# Patient Record
Sex: FEMALE | Race: WHITE | NOT HISPANIC OR LATINO | URBAN - METROPOLITAN AREA
[De-identification: names, ages, dates, MRNs, and addresses within clinical notes are randomized per-mention and may not be internally consistent; named-entity substitution may affect disease eponyms.]

---

## 2018-11-26 ENCOUNTER — INPATIENT (INPATIENT)
Facility: HOSPITAL | Age: 83
LOS: 1 days | Discharge: HOME | DRG: 310 | End: 2018-11-28
Attending: INTERNAL MEDICINE | Admitting: INTERNAL MEDICINE
Payer: MEDICARE

## 2018-11-26 VITALS
TEMPERATURE: 98 F | WEIGHT: 175.05 LBS | SYSTOLIC BLOOD PRESSURE: 176 MMHG | DIASTOLIC BLOOD PRESSURE: 118 MMHG | OXYGEN SATURATION: 98 % | HEART RATE: 106 BPM | RESPIRATION RATE: 16 BRPM

## 2018-11-26 DIAGNOSIS — R07.9 CHEST PAIN, UNSPECIFIED: ICD-10-CM

## 2018-11-26 DIAGNOSIS — I48.91 UNSPECIFIED ATRIAL FIBRILLATION: ICD-10-CM

## 2018-11-26 DIAGNOSIS — E11.9 TYPE 2 DIABETES MELLITUS WITHOUT COMPLICATIONS: ICD-10-CM

## 2018-11-26 DIAGNOSIS — I10 ESSENTIAL (PRIMARY) HYPERTENSION: ICD-10-CM

## 2018-11-26 DIAGNOSIS — E78.00 PURE HYPERCHOLESTEROLEMIA, UNSPECIFIED: ICD-10-CM

## 2018-11-26 DIAGNOSIS — Z29.9 ENCOUNTER FOR PROPHYLACTIC MEASURES, UNSPECIFIED: ICD-10-CM

## 2018-11-26 LAB
ACETONE SERPL-MCNC: NEGATIVE — SIGNIFICANT CHANGE UP
ALBUMIN SERPL ELPH-MCNC: 3.3 G/DL — LOW (ref 3.5–5)
ALP SERPL-CCNC: 59 U/L — SIGNIFICANT CHANGE UP (ref 40–120)
ALT FLD-CCNC: 18 U/L DA — SIGNIFICANT CHANGE UP (ref 10–60)
ANION GAP SERPL CALC-SCNC: 10 MMOL/L — SIGNIFICANT CHANGE UP (ref 5–17)
APPEARANCE UR: CLEAR — SIGNIFICANT CHANGE UP
APTT BLD: 30.9 SEC — SIGNIFICANT CHANGE UP (ref 27.5–36.3)
AST SERPL-CCNC: 22 U/L — SIGNIFICANT CHANGE UP (ref 10–40)
BILIRUB SERPL-MCNC: 0.5 MG/DL — SIGNIFICANT CHANGE UP (ref 0.2–1.2)
BILIRUB UR-MCNC: NEGATIVE — SIGNIFICANT CHANGE UP
BUN SERPL-MCNC: 22 MG/DL — HIGH (ref 7–18)
CALCIUM SERPL-MCNC: 8.6 MG/DL — SIGNIFICANT CHANGE UP (ref 8.4–10.5)
CHLORIDE SERPL-SCNC: 104 MMOL/L — SIGNIFICANT CHANGE UP (ref 96–108)
CK SERPL-CCNC: 48 U/L — SIGNIFICANT CHANGE UP (ref 21–215)
CO2 SERPL-SCNC: 24 MMOL/L — SIGNIFICANT CHANGE UP (ref 22–31)
COLOR SPEC: YELLOW — SIGNIFICANT CHANGE UP
CREAT SERPL-MCNC: 0.88 MG/DL — SIGNIFICANT CHANGE UP (ref 0.5–1.3)
DIFF PNL FLD: NEGATIVE — SIGNIFICANT CHANGE UP
GLUCOSE SERPL-MCNC: 145 MG/DL — HIGH (ref 70–99)
GLUCOSE UR QL: NEGATIVE — SIGNIFICANT CHANGE UP
HCT VFR BLD CALC: 34.5 % — SIGNIFICANT CHANGE UP (ref 34.5–45)
HGB BLD-MCNC: 10.2 G/DL — LOW (ref 11.5–15.5)
INR BLD: 1.3 RATIO — HIGH (ref 0.88–1.16)
KETONES UR-MCNC: NEGATIVE — SIGNIFICANT CHANGE UP
LEUKOCYTE ESTERASE UR-ACNC: NEGATIVE — SIGNIFICANT CHANGE UP
LIDOCAIN IGE QN: 162 U/L — SIGNIFICANT CHANGE UP (ref 73–393)
MAGNESIUM SERPL-MCNC: 1.9 MG/DL — SIGNIFICANT CHANGE UP (ref 1.6–2.6)
MCHC RBC-ENTMCNC: 21.2 PG — LOW (ref 27–34)
MCHC RBC-ENTMCNC: 29.6 GM/DL — LOW (ref 32–36)
MCV RBC AUTO: 71.7 FL — LOW (ref 80–100)
NITRITE UR-MCNC: NEGATIVE — SIGNIFICANT CHANGE UP
NT-PROBNP SERPL-SCNC: 5231 PG/ML — HIGH (ref 0–450)
PH UR: 6.5 — SIGNIFICANT CHANGE UP (ref 5–8)
PLATELET # BLD AUTO: 253 K/UL — SIGNIFICANT CHANGE UP (ref 150–400)
POTASSIUM SERPL-MCNC: 3.7 MMOL/L — SIGNIFICANT CHANGE UP (ref 3.5–5.3)
POTASSIUM SERPL-SCNC: 3.7 MMOL/L — SIGNIFICANT CHANGE UP (ref 3.5–5.3)
PROT SERPL-MCNC: 7.8 G/DL — SIGNIFICANT CHANGE UP (ref 6–8.3)
PROT UR-MCNC: NEGATIVE — SIGNIFICANT CHANGE UP
PROTHROM AB SERPL-ACNC: 14.6 SEC — HIGH (ref 10–12.9)
RBC # BLD: 4.82 M/UL — SIGNIFICANT CHANGE UP (ref 3.8–5.2)
RBC # FLD: 17.5 % — HIGH (ref 10.3–14.5)
SODIUM SERPL-SCNC: 138 MMOL/L — SIGNIFICANT CHANGE UP (ref 135–145)
SP GR SPEC: 1.01 — SIGNIFICANT CHANGE UP (ref 1.01–1.02)
T4 AB SER-ACNC: 15.7 UG/DL — HIGH (ref 4.6–12)
TROPONIN I SERPL-MCNC: 0.03 NG/ML — SIGNIFICANT CHANGE UP (ref 0–0.04)
TSH SERPL-MCNC: 1.65 UU/ML — SIGNIFICANT CHANGE UP (ref 0.34–4.82)
UROBILINOGEN FLD QL: NEGATIVE — SIGNIFICANT CHANGE UP
WBC # BLD: 10.4 K/UL — SIGNIFICANT CHANGE UP (ref 3.8–10.5)
WBC # FLD AUTO: 10.4 K/UL — SIGNIFICANT CHANGE UP (ref 3.8–10.5)

## 2018-11-26 PROCEDURE — 71045 X-RAY EXAM CHEST 1 VIEW: CPT | Mod: 26

## 2018-11-26 PROCEDURE — 99291 CRITICAL CARE FIRST HOUR: CPT

## 2018-11-26 RX ORDER — METOPROLOL TARTRATE 50 MG
5 TABLET ORAL ONCE
Qty: 0 | Refills: 0 | Status: COMPLETED | OUTPATIENT
Start: 2018-11-26 | End: 2018-11-26

## 2018-11-26 RX ORDER — LOSARTAN POTASSIUM 100 MG/1
100 TABLET, FILM COATED ORAL DAILY
Qty: 0 | Refills: 0 | Status: DISCONTINUED | OUTPATIENT
Start: 2018-11-26 | End: 2018-11-26

## 2018-11-26 RX ORDER — ATORVASTATIN CALCIUM 80 MG/1
40 TABLET, FILM COATED ORAL AT BEDTIME
Qty: 0 | Refills: 0 | Status: DISCONTINUED | OUTPATIENT
Start: 2018-11-26 | End: 2018-11-28

## 2018-11-26 RX ORDER — ENOXAPARIN SODIUM 100 MG/ML
90 INJECTION SUBCUTANEOUS ONCE
Qty: 0 | Refills: 0 | Status: COMPLETED | OUTPATIENT
Start: 2018-11-26 | End: 2018-11-26

## 2018-11-26 RX ORDER — CARVEDILOL PHOSPHATE 80 MG/1
12.5 CAPSULE, EXTENDED RELEASE ORAL EVERY 12 HOURS
Qty: 0 | Refills: 0 | Status: DISCONTINUED | OUTPATIENT
Start: 2018-11-26 | End: 2018-11-27

## 2018-11-26 RX ORDER — MECLIZINE HCL 12.5 MG
12.5 TABLET ORAL THREE TIMES A DAY
Qty: 0 | Refills: 0 | Status: DISCONTINUED | OUTPATIENT
Start: 2018-11-26 | End: 2018-11-28

## 2018-11-26 RX ORDER — ENOXAPARIN SODIUM 100 MG/ML
80 INJECTION SUBCUTANEOUS
Qty: 0 | Refills: 0 | Status: DISCONTINUED | OUTPATIENT
Start: 2018-11-27 | End: 2018-11-28

## 2018-11-26 RX ORDER — LOSARTAN POTASSIUM 100 MG/1
100 TABLET, FILM COATED ORAL DAILY
Qty: 0 | Refills: 0 | Status: DISCONTINUED | OUTPATIENT
Start: 2018-11-26 | End: 2018-11-28

## 2018-11-26 RX ORDER — INSULIN LISPRO 100/ML
VIAL (ML) SUBCUTANEOUS
Qty: 0 | Refills: 0 | Status: DISCONTINUED | OUTPATIENT
Start: 2018-11-26 | End: 2018-11-28

## 2018-11-26 RX ORDER — ASPIRIN/CALCIUM CARB/MAGNESIUM 324 MG
81 TABLET ORAL DAILY
Qty: 0 | Refills: 0 | Status: DISCONTINUED | OUTPATIENT
Start: 2018-11-26 | End: 2018-11-28

## 2018-11-26 RX ORDER — CARVEDILOL PHOSPHATE 80 MG/1
12.5 CAPSULE, EXTENDED RELEASE ORAL EVERY 12 HOURS
Qty: 0 | Refills: 0 | Status: DISCONTINUED | OUTPATIENT
Start: 2018-11-26 | End: 2018-11-26

## 2018-11-26 RX ADMIN — CARVEDILOL PHOSPHATE 12.5 MILLIGRAM(S): 80 CAPSULE, EXTENDED RELEASE ORAL at 22:43

## 2018-11-26 RX ADMIN — ENOXAPARIN SODIUM 90 MILLIGRAM(S): 100 INJECTION SUBCUTANEOUS at 21:25

## 2018-11-26 RX ADMIN — ATORVASTATIN CALCIUM 40 MILLIGRAM(S): 80 TABLET, FILM COATED ORAL at 22:43

## 2018-11-26 RX ADMIN — LOSARTAN POTASSIUM 100 MILLIGRAM(S): 100 TABLET, FILM COATED ORAL at 22:43

## 2018-11-26 RX ADMIN — Medication 5 MILLIGRAM(S): at 21:25

## 2018-11-26 NOTE — ED PROVIDER NOTE - PROGRESS NOTE DETAILS
pt with new onset a. fib, d/w Dr. Theodore, will admit pt with new onset a. fib, d/w Dr. Theodore who is @ bedside, will admit

## 2018-11-26 NOTE — H&P ADULT - PROBLEM SELECTOR PLAN 6
IMPROVE VTE Individual Risk Assessment    RISK                                                          Points  [] Previous VTE                                           3  [] Thrombophilia                                        2  [] Lower limb paralysis                              2   [] Current Cancer                                       2   [x] Immobilization > 24 hrs                        1  [] ICU/CCU stay > 24 hours                       1  [x] Age > 60                                                   1    IMPROVE VTE Score: 2  on full dose lovenox for new a fib  no need for GI ppx

## 2018-11-26 NOTE — H&P ADULT - NSHPPHYSICALEXAM_GEN_ALL_CORE
I have reviewed and confirmed nurses' notes...
Vital Signs Last 24 Hrs  T(C): 36.7 (26 Nov 2018 21:28), Max: 36.7 (26 Nov 2018 21:28)  T(F): 98.1 (26 Nov 2018 21:28), Max: 98.1 (26 Nov 2018 21:28)  HR: 108 (26 Nov 2018 21:28) (106 - 108)  BP: 188/91 (26 Nov 2018 21:28) (176/118 - 188/91)  BP(mean): --  RR: 18 (26 Nov 2018 21:28) (16 - 18)  SpO2: 97% (26 Nov 2018 21:28) (97% - 98%)

## 2018-11-26 NOTE — H&P ADULT - HISTORY OF PRESENT ILLNESS
83 y.o. female BIBA with h/o NIDDM, last dose yest., pt went to cardiologist for echo & cardiac evaluation.  While in the office, pt developed MSCP radiated to back, "fire" pain, sob, dizziness, no diaphoresis, palpitation, EKG done showed new onset A.fib & sent to ED.  pt with mild coughing, leg edema, 84 yo female lives alone, with PMH of DM, HTN  and HLD BIBA c/w left chest pain. Pt went to cardiologist DR. Bray for echo & cardiac evaluation. While in the office, pt developed chest pain and radiated to back, "fire" pain, dizziness, no diaphoresis, EKG done showed new onset A.fib and then pt was sent to ED. 84 yo female lives alone, with PMH of DM, HTN  and HLD BIBA c/w left chest pain. Pt went to cardiologist DR. Bray for echo & cardiac evaluation today. While in the office, pt developed chest pain and radiated to back, "fire" pain, 10/10. Pt also c/o dizziness, denies diaphoresis, EKG done showed new onset A.fib and then pt was sent to ED. In ED, pt initial vitals noted bp 178/118, , labs noted Hb 10. 2,  CXR unremarkable. s/p metoprolol 5mg iv x1 and lovenox 90mg iv x1 in ED. Currently chest pain and back pain improved. 84 yo female lives alone, with PMH of DM, HTN  and HLD BIBA c/w left chest pain. Pt went to cardiologist DR. Bray for echo & cardiac evaluation today. While in the office, pt developed chest pain and radiated to back, "fire" pain, 10/10, lasting 30mins . Pt also c/o dizziness, denies diaphoresis, EKG done showed new onset A.fib and then pt was sent to ED. Pt denies headache, abd pain, n/v/d/c or any other complains. In ED, pt initial vitals noted bp 178/118, , labs noted Hb 10. 2,  CXR unremarkable. s/p metoprolol 5mg iv x1 and lovenox 90mg iv x1 in ED. Currently chest pain and back pain improved. 84 yo female lives alone, with PMH of DM, HTN  and HLD BIBA c/o left chest pain. Pt went to cardiologist DR. Bray for echo & cardiac evaluation today. While in the office, pt developed chest pain and radiated to back, "fire" pain, 10/10, lasting 30mins . Pt also c/o dizziness, denies diaphoresis, EKG done showed new onset A.fib and then pt was sent to ED. Pt denies headache, abd pain, n/v/d/c or any other complains. In ED, pt initial vitals noted bp 178/118, , labs noted Hb 10. 2,  CXR unremarkable. s/p metoprolol 5mg iv x1 and lovenox 90mg iv x1 in ED. Currently chest pain and back pain improved.

## 2018-11-26 NOTE — ED PROVIDER NOTE - CARE PLAN
Principal Discharge DX:	New onset atrial fibrillation Principal Discharge DX:	New onset atrial fibrillation  Secondary Diagnosis:	Chest pain

## 2018-11-26 NOTE — H&P ADULT - PROBLEM SELECTOR PLAN 2
EKG noted a fib with RVR @ 100bpm, TWI in lead L   Troponin negative x1   chadsvac score 5, pt need AC   s/p lovenox 90mg x1 in ED  C/W full dose lovenox  c/w carvedilol   on telemetry

## 2018-11-26 NOTE — H&P ADULT - ASSESSMENT
84 yo female lives alone, with PMH of DM, HTN  and HLD BIBA c/w left chest pain. Pt went to cardiologist DR. Bray for echo & cardiac evaluation today. While in the office, pt developed chest pain and radiated to back, "fire" pain, 10/10. Pt also c/o dizziness, denies diaphoresis, EKG done showed new onset A.fib and then pt was sent to ED. In ED, pt initial vitals noted bp 178/118, , labs noted Hb 10. 2,  CXR unremarkable. s/p metoprolol 5mg iv x1 and lovenox 90mg iv x1 in ED. Currently chest pain and back pain improved. 82 yo female lives alone, with PMH of DM, HTN  and HLD BIBA c/w left chest pain. Pt went to cardiologist DR. Bray for echo & cardiac evaluation today. While in the office, pt developed chest pain and radiated to back, "fire" pain, 10/10, lasting 30mins. Pt also c/o dizziness, denies diaphoresis, EKG done showed new onset A.fib and then pt was sent to ED. In ED, pt initial vitals noted bp 178/118, , labs noted Hb 10. 2,  CXR unremarkable. s/p metoprolol 5mg iv x1 and lovenox 90mg iv x1 in ED. Currently chest pain and back pain improved.

## 2018-11-26 NOTE — ED ADULT NURSE NOTE - NSIMPLEMENTINTERV_GEN_ALL_ED
Implemented All Fall with Harm Risk Interventions:  [place] to call system. Call bell, personal items and telephone within reach. Instruct patient to call for assistance. Room bathroom lighting operational. Non-slip footwear when patient is off stretcher. Physically safe environment: no spills, clutter or unnecessary equipment. Stretcher in lowest position, wheels locked, appropriate side rails in place. Provide visual cue, wrist band, yellow gown, etc. Monitor gait and stability. Monitor for mental status changes and reorient to person, place, and time. Review medications for side effects contributing to fall risk. Reinforce activity limits and safety measures with patient and family. Provide visual clues: red socks.

## 2018-11-26 NOTE — H&P ADULT - PROBLEM SELECTOR PLAN 1
p/w left cp radiating to back, 10/10, lasting 30mins  heart score 4   EKG noted a fib with RVR @ 100bpm, TWI in lead L   Troponin negative x1   will r/o ACS   on telemetry  on asa, statin and carvedilol   F/U TTE and cardiac enzyme  cardio consulted DR Babin p/w left cp radiating to back, 10/10, lasting 30mins  heart score 4   EKG noted a fib with RVR @ 100bpm, TWI in lead L   Troponin negative x1   will r/o ACS   on telemetry  on asa, statin and carvedilol   F/U TTE and cardiac enzyme  cardio consulted DR. Lisa

## 2018-11-26 NOTE — PROGRESS NOTE ADULT - SUBJECTIVE AND OBJECTIVE BOX
PEPE YO  83y  Female      Patient is a 83y old  Female who presents with a chief complaint of     INTERVAL HPI/OVERNIGHT EVENTS:        REVIEW OF SYSTEMS:  CONSTITUTIONAL: No fever, weight loss, + fatigue  EYES: No eye pain, visual disturbances, or discharge  ENMT:   No sinus or throat pain  NECK: No pain or stiffness  BREASTS: No pain, masses, or nipple discharge  RESPIRATORY: + cough, wheezing, chills or hemoptysis; No shortness of breath  CARDIOVASCULAR: +chest pain, palpitations, dizziness, + leg swelling  GASTROINTESTINAL: No abdominal or epigastric pain. No nausea, vomiting, or hematemesis; No diarrhea or constipation. No melena or hematochezia.  GENITOURINARY: No hematuria  NEUROLOGICAL: AxOx3    SKIN: No itching, burning, rashes, or lesions   LYMPH NODES: No enlarged glands  ENDOCRINE: No heat or cold intolerance; No hair loss  MUSCULOSKELETAL: No joint pain    HEME/LYMPH: No easy bruising, or bleeding gums  ALLERY AND IMMUNOLOGIC: No hives or eczema    T(C): 36.6 (18 @ 15:49), Max: 36.6 (18 @ 15:49)  HR: 106 (18 @ 15:49) (106 - 106)  BP: 176/118 (18 @ 15:49) (176/118 - 176/118)  RR: 16 (18 @ 15:49) (16 - 16)  SpO2: 98% (18 @ 15:49) (98% - 98%)  Wt(kg): --Vital Signs Last 24 Hrs  T(C): 36.6 (2018 15:49), Max: 36.6 (2018 15:49)  T(F): 97.8 (2018 15:49), Max: 97.8 (2018 15:49)  HR: 106 (2018 15:49) (106 - 106)  BP: 176/118 (2018 15:49) (176/118 - 176/118)  BP(mean): --  RR: 16 (2018 15:49) (16 - 16)  SpO2: 98% (2018 15:49) (98% - 98%)    PHYSICAL EXAM:  GENERAL: NAD, well-groomed, well-developed  HEAD:  Atraumatic, Normocephalic  EYES: EOMI, PERRLA, conjunctiva and sclera clear  ENMT: No tonsillar erythema, exudates, or enlargement; Moist mucous membranes, Good dentition, No lesions  NECK: Supple, No JVD, Normal thyroid  NERVOUS SYSTEM:  demented.no focal neurodeficit.  CHEST/LUNG: Clear to percussion bilaterally; No rales, rhonchi, wheezing, or rubs  HEART: Regular rate and rhythm; No murmurs, rubs, or gallops  ABDOMEN: Soft, Nontender, Nondistended; Bowel sounds present  EXTREMITIES: + legs edema  LYMPH: No lymphadenopathy noted  SKIN: No rashes or lesions    Consultant(s) Notes Reviewed:  [x ] YES  [ ] NO  Care Discussed with Consultants/Other Providers [ x] YES  [ ] NO    LABS:                        10.2   10.4  )-----------( 253      ( 2018 17:01 )             34.5         138  |  104  |  22<H>  ----------------------------<  145<H>  3.7   |  24  |  0.88    Ca    8.6      2018 17:01  Mg     1.9         TPro  7.8  /  Alb  3.3<L>  /  TBili  0.5  /  DBili  x   /  AST  22  /  ALT  18  /  AlkPhos  59      PT/INR - ( 2018 17:01 )   PT: 14.6 sec;   INR: 1.30 ratio         PTT - ( 2018 17:01 )  PTT:30.9 sec  Urinalysis Basic - ( 2018 17:01 )    Color: Yellow / Appearance: Clear / S.010 / pH: x  Gluc: x / Ketone: Negative  / Bili: Negative / Urobili: Negative   Blood: x / Protein: Negative / Nitrite: Negative   Leuk Esterase: Negative / RBC: x / WBC x   Sq Epi: x / Non Sq Epi: x / Bacteria: x      CAPILLARY BLOOD GLUCOSE      POCT Blood Glucose.: 172 mg/dL (2018 16:13)        Urinalysis Basic - ( 2018 17:01 )    Color: Yellow / Appearance: Clear / S.010 / pH: x  Gluc: x / Ketone: Negative  / Bili: Negative / Urobili: Negative   Blood: x / Protein: Negative / Nitrite: Negative   Leuk Esterase: Negative / RBC: x / WBC x   Sq Epi: x / Non Sq Epi: x / Bacteria: x        RADIOLOGY & ADDITIONAL TESTS:    Imaging Personally Reviewed:  [ ] YES  [ ] NO

## 2018-11-26 NOTE — ED ADULT NURSE NOTE - ED STAT RN HANDOFF DETAILS
Patient was endorsed to PARAMJIT Woodall in stable condition and no acute distress. Patient stated dizziness has now resolved.

## 2018-11-26 NOTE — ED PROVIDER NOTE - OBJECTIVE STATEMENT
83 y.o. female BIBA with h/o NIDDM, last dose yest., pt went to cardiologist for echo & cardiac evaluation.  While in the office, pt developed MSCP radiated to back, "fire" pain, sob, dizziness, no diaphoresis, palpitation, EKG done showed new onset A.fib & sent to ED.  pt with mild coughing, leg edema,

## 2018-11-26 NOTE — H&P ADULT - NSHPLABSRESULTS_GEN_ALL_CORE
Complete Blood Count (11.26.18 @ 17:01)    WBC Count: 10.4 K/uL    RBC Count: 4.82 M/uL    Hemoglobin: 10.2 g/dL    Hematocrit: 34.5 %    Mean Cell Volume: 71.7 fl    Mean Cell Hemoglobin: 21.2 pg    Mean Cell Hemoglobin Conc: 29.6 gm/dL    Red Cell Distrib Width: 17.5 %    Platelet Count - Automated: 253 K/uL      Comprehensive Metabolic Panel (11.26.18 @ 17:01)    Sodium, Serum: 138 mmol/L    Potassium, Serum: 3.7 mmol/L    Chloride, Serum: 104 mmol/L    Carbon Dioxide, Serum: 24 mmol/L    Anion Gap, Serum: 10 mmol/L    Blood Urea Nitrogen, Serum: 22 mg/dL    Creatinine, Serum: 0.88 mg/dL    Glucose, Serum: 145 mg/dL    Calcium, Total Serum: 8.6 mg/dL    Protein Total, Serum: 7.8 g/dL    Albumin, Serum: 3.3 g/dL    Bilirubin Total, Serum: 0.5 mg/dL    Alkaline Phosphatase, Serum: 59 U/L    Aspartate Aminotransferase (AST/SGOT): 22 U/L    Alanine Aminotransferase (ALT/SGPT): 18 U/L DA    eGFR if Non : 61: Interpretative comment  The units for eGFR are ml/min/1.73m2 (normalized body surface area). The  eGFR is calculated from a serum creatinine using the CKD-EPI equation.  Other variables required for calculation are race, age and sex. Among  patients with chronic kidney disease (CKD), the eGFR is useful in  determining the stage of disease according to KDOQI CKD classification.  All eGFR results are reported numerically with the following  interpretation.          GFR                    With                 Without     (ml/min/1.73 m2)    Kidney Damage       Kidney Damage        >= 90                    Stage 1                     Normal        60-89                    Stage 2                     Decreased GFR        30-59     Stage 3                     Stage 3        15-29                    Stage 4                     Stage 4        < 15                      Stage 5                     Stage 5  Each stage of CKD assumes that the associated GFR level has been in  effect for at least 3 months. Determination of stages one and two (with  eGFR > 59 ml/min/m2) requires estimation of kidney damage for at least 3  months as defined by structural or functional abnormalities.  Limitations: All estimates of GFR will be less accurate for patients at  extremes of muscle mass (including but not limited to frail elderly,  critically ill, or cancer patients), those with unusual diets, and those  with conditions associated with reduced secretion or extrarenal  elimination of creatinine. The eGFR equation is not recommended for use  in patients with unstable creatinine levels. mL/min/1.73M2    eGFR if African American: 70 mL/min/1.73M2

## 2018-11-26 NOTE — ED ADULT NURSE NOTE - OBJECTIVE STATEMENT
Pt presented to ED c/o chest pain before having a stress test today, was sent to ED with new onset afib  Pt ambulatory with rolling walker, noted with tachypnea, no chest pain

## 2018-11-27 LAB
ANION GAP SERPL CALC-SCNC: 8 MMOL/L — SIGNIFICANT CHANGE UP (ref 5–17)
BASOPHILS # BLD AUTO: 0.1 K/UL — SIGNIFICANT CHANGE UP (ref 0–0.2)
BASOPHILS NFR BLD AUTO: 1.4 % — SIGNIFICANT CHANGE UP (ref 0–2)
BUN SERPL-MCNC: 23 MG/DL — HIGH (ref 7–18)
CALCIUM SERPL-MCNC: 8 MG/DL — LOW (ref 8.4–10.5)
CHLORIDE SERPL-SCNC: 106 MMOL/L — SIGNIFICANT CHANGE UP (ref 96–108)
CHOLEST SERPL-MCNC: 105 MG/DL — SIGNIFICANT CHANGE UP (ref 10–199)
CK MB BLD-MCNC: 3.1 % — SIGNIFICANT CHANGE UP (ref 0–3.5)
CK MB CFR SERPL CALC: 1.2 NG/ML — SIGNIFICANT CHANGE UP (ref 0–3.6)
CK SERPL-CCNC: 39 U/L — SIGNIFICANT CHANGE UP (ref 21–215)
CO2 SERPL-SCNC: 26 MMOL/L — SIGNIFICANT CHANGE UP (ref 22–31)
CREAT SERPL-MCNC: 0.81 MG/DL — SIGNIFICANT CHANGE UP (ref 0.5–1.3)
EOSINOPHIL # BLD AUTO: 0.2 K/UL — SIGNIFICANT CHANGE UP (ref 0–0.5)
EOSINOPHIL NFR BLD AUTO: 1.8 % — SIGNIFICANT CHANGE UP (ref 0–6)
FERRITIN SERPL-MCNC: 20 NG/ML — SIGNIFICANT CHANGE UP (ref 15–150)
FOLATE SERPL-MCNC: 6.2 NG/ML — SIGNIFICANT CHANGE UP
GLUCOSE BLDC GLUCOMTR-MCNC: 124 MG/DL — HIGH (ref 70–99)
GLUCOSE BLDC GLUCOMTR-MCNC: 152 MG/DL — HIGH (ref 70–99)
GLUCOSE BLDC GLUCOMTR-MCNC: 182 MG/DL — HIGH (ref 70–99)
GLUCOSE BLDC GLUCOMTR-MCNC: 315 MG/DL — HIGH (ref 70–99)
GLUCOSE SERPL-MCNC: 143 MG/DL — HIGH (ref 70–99)
HBA1C BLD-MCNC: 6.8 % — HIGH (ref 4–5.6)
HCT VFR BLD CALC: 34.2 % — LOW (ref 34.5–45)
HDLC SERPL-MCNC: 44 MG/DL — LOW
HGB BLD-MCNC: 10 G/DL — LOW (ref 11.5–15.5)
IRON SATN MFR SERPL: 27 UG/DL — LOW (ref 40–150)
IRON SATN MFR SERPL: 6 % — LOW (ref 15–50)
LIPID PNL WITH DIRECT LDL SERPL: 46 MG/DL — SIGNIFICANT CHANGE UP
LYMPHOCYTES # BLD AUTO: 1.5 K/UL — SIGNIFICANT CHANGE UP (ref 1–3.3)
LYMPHOCYTES # BLD AUTO: 15.9 % — SIGNIFICANT CHANGE UP (ref 13–44)
MCHC RBC-ENTMCNC: 21.1 PG — LOW (ref 27–34)
MCHC RBC-ENTMCNC: 29.4 GM/DL — LOW (ref 32–36)
MCV RBC AUTO: 72 FL — LOW (ref 80–100)
MONOCYTES # BLD AUTO: 0.9 K/UL — SIGNIFICANT CHANGE UP (ref 0–0.9)
MONOCYTES NFR BLD AUTO: 9.7 % — SIGNIFICANT CHANGE UP (ref 2–14)
NEUTROPHILS # BLD AUTO: 6.6 K/UL — SIGNIFICANT CHANGE UP (ref 1.8–7.4)
NEUTROPHILS NFR BLD AUTO: 71.2 % — SIGNIFICANT CHANGE UP (ref 43–77)
PLATELET # BLD AUTO: 249 K/UL — SIGNIFICANT CHANGE UP (ref 150–400)
POTASSIUM SERPL-MCNC: 4.5 MMOL/L — SIGNIFICANT CHANGE UP (ref 3.5–5.3)
POTASSIUM SERPL-SCNC: 4.5 MMOL/L — SIGNIFICANT CHANGE UP (ref 3.5–5.3)
RBC # BLD: 4.74 M/UL — SIGNIFICANT CHANGE UP (ref 3.8–5.2)
RBC # FLD: 17.6 % — HIGH (ref 10.3–14.5)
SODIUM SERPL-SCNC: 140 MMOL/L — SIGNIFICANT CHANGE UP (ref 135–145)
T3 SERPL-MCNC: 90 NG/DL — SIGNIFICANT CHANGE UP (ref 80–200)
TIBC SERPL-MCNC: 492 UG/DL — HIGH (ref 250–450)
TOTAL CHOLESTEROL/HDL RATIO MEASUREMENT: 2.4 RATIO — LOW (ref 3.3–7.1)
TRIGL SERPL-MCNC: 75 MG/DL — SIGNIFICANT CHANGE UP (ref 10–149)
TROPONIN I SERPL-MCNC: 0.03 NG/ML — SIGNIFICANT CHANGE UP (ref 0–0.04)
TROPONIN I SERPL-MCNC: 0.04 NG/ML — SIGNIFICANT CHANGE UP (ref 0–0.04)
UIBC SERPL-MCNC: 465 UG/DL — HIGH (ref 110–370)
VIT B12 SERPL-MCNC: 405 PG/ML — SIGNIFICANT CHANGE UP (ref 232–1245)
WBC # BLD: 9.3 K/UL — SIGNIFICANT CHANGE UP (ref 3.8–10.5)
WBC # FLD AUTO: 9.3 K/UL — SIGNIFICANT CHANGE UP (ref 3.8–10.5)

## 2018-11-27 RX ORDER — CARVEDILOL PHOSPHATE 80 MG/1
25 CAPSULE, EXTENDED RELEASE ORAL EVERY 12 HOURS
Qty: 0 | Refills: 0 | Status: DISCONTINUED | OUTPATIENT
Start: 2018-11-27 | End: 2018-11-28

## 2018-11-27 RX ORDER — CARVEDILOL PHOSPHATE 80 MG/1
12.5 CAPSULE, EXTENDED RELEASE ORAL ONCE
Qty: 0 | Refills: 0 | Status: COMPLETED | OUTPATIENT
Start: 2018-11-27 | End: 2018-11-27

## 2018-11-27 RX ORDER — FERROUS SULFATE 325(65) MG
325 TABLET ORAL
Qty: 0 | Refills: 0 | Status: DISCONTINUED | OUTPATIENT
Start: 2018-11-27 | End: 2018-11-28

## 2018-11-27 RX ORDER — METFORMIN HYDROCHLORIDE 850 MG/1
500 TABLET ORAL
Qty: 0 | Refills: 0 | Status: DISCONTINUED | OUTPATIENT
Start: 2018-11-27 | End: 2018-11-28

## 2018-11-27 RX ADMIN — Medication 325 MILLIGRAM(S): at 17:27

## 2018-11-27 RX ADMIN — METFORMIN HYDROCHLORIDE 500 MILLIGRAM(S): 850 TABLET ORAL at 17:27

## 2018-11-27 RX ADMIN — CARVEDILOL PHOSPHATE 25 MILLIGRAM(S): 80 CAPSULE, EXTENDED RELEASE ORAL at 17:27

## 2018-11-27 RX ADMIN — ENOXAPARIN SODIUM 80 MILLIGRAM(S): 100 INJECTION SUBCUTANEOUS at 08:14

## 2018-11-27 RX ADMIN — Medication 12.5 MILLIGRAM(S): at 11:46

## 2018-11-27 RX ADMIN — CARVEDILOL PHOSPHATE 12.5 MILLIGRAM(S): 80 CAPSULE, EXTENDED RELEASE ORAL at 05:10

## 2018-11-27 RX ADMIN — ATORVASTATIN CALCIUM 40 MILLIGRAM(S): 80 TABLET, FILM COATED ORAL at 23:12

## 2018-11-27 RX ADMIN — Medication 81 MILLIGRAM(S): at 11:15

## 2018-11-27 RX ADMIN — Medication 12.5 MILLIGRAM(S): at 06:06

## 2018-11-27 RX ADMIN — LOSARTAN POTASSIUM 100 MILLIGRAM(S): 100 TABLET, FILM COATED ORAL at 05:10

## 2018-11-27 RX ADMIN — CARVEDILOL PHOSPHATE 12.5 MILLIGRAM(S): 80 CAPSULE, EXTENDED RELEASE ORAL at 09:34

## 2018-11-27 NOTE — CONSULT NOTE ADULT - SUBJECTIVE AND OBJECTIVE BOX
CHIEF COMPLAINT:Patient is a 83y old  Female who presents with a chief complaint of chest pain (26 Nov 2018 22:46)      HPI:  82 yo female lives alone, with PMH of DM, HTN  and HLD BIBA c/o left chest pain. Pt went to cardiologist DR. Bray for echo & cardiac evaluation today. While in the office, pt developed chest pain and radiated to back, "fire" pain, 10/10, lasting 30mins . Pt also c/o dizziness, denies diaphoresis, EKG done showed new onset A.fib and then pt was sent to ED. Pt denies headache, abd pain, n/v/d/c or any other complains. In ED, pt initial vitals noted bp 178/118, , labs noted Hb 10. 2,  CXR unremarkable. s/p metoprolol 5mg iv x1 and lovenox 90mg iv x1 in ED. Currently chest pain and back pain improved. (26 Nov 2018 22:46)      PAST MEDICAL & SURGICAL HISTORY:  Hypercholesterolemia  Diabetes  HTN (hypertension)        MEDICATIONS  (STANDING):  aspirin  chewable 81 milliGRAM(s) Oral daily  atorvastatin 40 milliGRAM(s) Oral at bedtime  carvedilol 25 milliGRAM(s) Oral every 12 hours  enoxaparin Injectable 80 milliGRAM(s) SubCutaneous <User Schedule>  ferrous    sulfate 325 milliGRAM(s) Oral two times a day  insulin lispro (HumaLOG) corrective regimen sliding scale   SubCutaneous Before meals and at bedtime  losartan 100 milliGRAM(s) Oral daily    MEDICATIONS  (PRN):  meclizine 12.5 milliGRAM(s) Oral three times a day PRN Dizziness      FAMILY HISTORY:  No pertinent family history in first degree relatives      SOCIAL HISTORY:    [x ] Non-smoker    [ x] Alcohol-denies    Allergies    No Known Allergies    Intolerances    	    REVIEW OF SYSTEMS:  CONSTITUTIONAL: No fever, weight loss, or fatigue  EYES: No eye pain, visual disturbances, or discharge  ENT:  No difficulty hearing, tinnitus, vertigo; No sinus or throat pain  NECK: No pain or stiffness  RESPIRATORY: No cough, wheezing, chills or hemoptysis; No Shortness of Breath  CARDIOVASCULAR: + chest pain, No palpitations, passing out, dizziness, or leg swelling  GASTROINTESTINAL: No abdominal or epigastric pain. No nausea, vomiting, or hematemesis; No diarrhea or constipation. No melena or hematochezia.  GENITOURINARY: No dysuria, frequency, hematuria, or incontinence  NEUROLOGICAL: No headaches, memory loss, loss of strength, numbness, or tremors  SKIN: No itching, burning, rashes, or lesions   LYMPH Nodes: No enlarged glands  ENDOCRINE: No heat or cold intolerance; No hair loss  MUSCULOSKELETAL: No joint pain or swelling; No muscle, back, or extremity pain  PSYCHIATRIC: No depression, anxiety, mood swings, or difficulty sleeping  HEME/LYMPH: No easy bruising, or bleeding gums  ALLERGY AND IMMUNOLOGIC: No hives or eczema	      PHYSICAL EXAM:  T(C): 36.7 (11-27-18 @ 10:49), Max: 37.2 (11-27-18 @ 05:08)  HR: 81 (11-27-18 @ 10:49) (80 - 108)  BP: 128/68 (11-27-18 @ 10:49) (128/68 - 188/91)  RR: 16 (11-27-18 @ 10:49) (16 - 18)  SpO2: 98% (11-27-18 @ 10:49) (96% - 98%)      Appearance: Normal	  HEENT:   Normal oral mucosa, PERRL, EOMI	  Lymphatic: No lymphadenopathy  Cardiovascular: Normal S1 S2, No JVD, No murmurs, No edema  Respiratory: Lungs clear to auscultation	  Psychiatry: A & O x 3, Mood & affect appropriate  Gastrointestinal:  Soft, Non-tender, + BS	  Skin: No rashes, No ecchymoses, No cyanosis	  Neurologic: Non-focal  Extremities: Normal range of motion, No clubbing, cyanosis or edema  Vascular: Peripheral pulses palpable 2+ bilaterally        ECG:  	afib, st-t lat leads    	  LABS:	 	    CARDIAC MARKERS:  CARDIAC MARKERS ( 27 Nov 2018 10:22 )  0.037 ng/mL / x     / x     / x     / x      CARDIAC MARKERS ( 27 Nov 2018 05:32 )  x     / x     / 39 U/L / x     / 1.2 ng/mL  CARDIAC MARKERS ( 27 Nov 2018 04:11 )  0.025 ng/mL / x     / x     / x     / x      CARDIAC MARKERS ( 26 Nov 2018 17:01 )  0.031 ng/mL / x     / 48 U/L / x     / x                                  10.0   9.3   )-----------( 249      ( 27 Nov 2018 05:32 )             34.2     11-27    140  |  106  |  23<H>  ----------------------------<  143<H>  4.5   |  26  |  0.81    Ca    8.0<L>      27 Nov 2018 05:32  Mg     1.9     11-26    TPro  7.8  /  Alb  3.3<L>  /  TBili  0.5  /  DBili  x   /  AST  22  /  ALT  18  /  AlkPhos  59  11-26    proBNP: Serum Pro-Brain Natriuretic Peptide: 5231 pg/mL (11-26 @ 17:01)    Lipid Profile: Cholesterol 105  LDL 46  HDL 44  TG 75    HgA1c: Hemoglobin A1C, Whole Blood: 6.8 % (11-27 @ 09:54)    TSH: Thyroid Stimulating Hormone, Serum: 1.65 uU/mL (11-26 @ 17:01)

## 2018-11-27 NOTE — PROGRESS NOTE ADULT - SUBJECTIVE AND OBJECTIVE BOX
PEPE YO  83y  Female      Patient is a 83y old  Female who presents with a chief complaint of chest pain (2018 14:04)      INTERVAL HPI/OVERNIGHT EVENTS:        REVIEW OF SYSTEMS:  CONSTITUTIONAL: No fever, weight loss, + fatigue  EYES: No eye pain, visual disturbances, or discharge  ENMT:   No sinus or throat pain  NECK: No pain or stiffness  BREASTS: No pain, masses, or nipple discharge  RESPIRATORY: No cough, wheezing, chills or hemoptysis; + shortness of breath  CARDIOVASCULAR: No chest pain, palpitations, dizziness, or leg swelling  GASTROINTESTINAL: No abdominal or epigastric pain. No nausea, vomiting, or hematemesis; No diarrhea or constipation. No melena or hematochezia.  GENITOURINARY: No hematuria  NEUROLOGICAL: demented    SKIN: No itching, burning, rashes, or lesions   LYMPH NODES: No enlarged glands  ENDOCRINE: No heat or cold intolerance; No hair loss  MUSCULOSKELETAL: No joint pain    HEME/LYMPH: No easy bruising, or bleeding gums  ALLERY AND IMMUNOLOGIC: No hives or eczema    T(C): 36.9 (18 @ 21:19), Max: 37.2 (18 @ 05:08)  HR: 84 (18 @ 21:19) (80 - 88)  BP: 153/94 (18 @ 21:19) (128/68 - 171/97)  RR: 18 (18 @ 21:19) (16 - 18)  SpO2: 97% (18 @ 21:19) (96% - 99%)  Wt(kg): --Vital Signs Last 24 Hrs  T(C): 36.9 (2018 21:19), Max: 37.2 (2018 05:08)  T(F): 98.4 (2018 21:19), Max: 98.9 (2018 05:08)  HR: 84 (2018 21:19) (80 - 88)  BP: 153/94 (2018 21:19) (128/68 - 171/97)  BP(mean): --  RR: 18 (:19) (16 - 18)  SpO2: 97% (2018 21:19) (96% - 99%)    PHYSICAL EXAM:  GENERAL: NAD, well-groomed, well-developed  HEAD:  Atraumatic, Normocephalic  EYES: EOMI, PERRLA, conjunctiva and sclera clear  ENMT: No tonsillar erythema, exudates, or enlargement; Moist mucous membranes, Good dentition, No lesions  NECK: Supple, No JVD, Normal thyroid  NERVOUS SYSTEM:  demented.no focal neurodeficit.  CHEST/LUNG: Clear to percussion bilaterally; No rales, + rhonchi, wheezing, or rubs  HEART: Regular rate and rhythm; No murmurs, rubs, or gallops  ABDOMEN: Soft, Nontender, Nondistended; Bowel sounds present  EXTREMITIES: Blayne pedal edema  LYMPH: No lymphadenopathy noted  SKIN: No rashes or lesions    Consultant(s) Notes Reviewed:  [x ] YES  [ ] NO  Care Discussed with Consultants/Other Providers [ x] YES  [ ] NO    LABS:                        10.0   9.3   )-----------( 249      ( 2018 05:32 )             34.2         140  |  106  |  23<H>  ----------------------------<  143<H>  4.5   |  26  |  0.81    Ca    8.0<L>      2018 05:32  Mg     1.9         TPro  7.8  /  Alb  3.3<L>  /  TBili  0.5  /  DBili  x   /  AST  22  /  ALT  18  /  AlkPhos  59  11-    PT/INR - ( 2018 17:01 )   PT: 14.6 sec;   INR: 1.30 ratio         PTT - ( 2018 17:01 )  PTT:30.9 sec  Urinalysis Basic - ( 2018 17:01 )    Color: Yellow / Appearance: Clear / S.010 / pH: x  Gluc: x / Ketone: Negative  / Bili: Negative / Urobili: Negative   Blood: x / Protein: Negative / Nitrite: Negative   Leuk Esterase: Negative / RBC: x / WBC x   Sq Epi: x / Non Sq Epi: x / Bacteria: x      CAPILLARY BLOOD GLUCOSE      POCT Blood Glucose.: 124 mg/dL (2018 23:11)  POCT Blood Glucose.: 182 mg/dL (2018 16:28)  POCT Blood Glucose.: 315 mg/dL (2018 11:29)  POCT Blood Glucose.: 152 mg/dL (2018 08:09)        Urinalysis Basic - ( 2018 17:01 )    Color: Yellow / Appearance: Clear / S.010 / pH: x  Gluc: x / Ketone: Negative  / Bili: Negative / Urobili: Negative   Blood: x / Protein: Negative / Nitrite: Negative   Leuk Esterase: Negative / RBC: x / WBC x   Sq Epi: x / Non Sq Epi: x / Bacteria: x        RADIOLOGY & ADDITIONAL TESTS:    Imaging Personally Reviewed:  [ ] YES  [ ] NO

## 2018-11-27 NOTE — CONSULT NOTE ADULT - ASSESSMENT
84 yo female lives alone, with PMH of DM, HTN  and HLD BIBA c/o left chest pain and new onset afib while at stress test and sent to ER.  1.Tele monitoring.  2.Obtaincopy of echo and stress test done yesterday.  3.Afib-coreg,lovenox.  4.HTN-cozaar.  5.DM-insulin.  6.Cont asa,statin.  7.PPI.  8.Lipid d/o-statin.

## 2018-11-28 ENCOUNTER — TRANSCRIPTION ENCOUNTER (OUTPATIENT)
Age: 83
End: 2018-11-28

## 2018-11-28 VITALS — SYSTOLIC BLOOD PRESSURE: 164 MMHG | HEART RATE: 85 BPM | DIASTOLIC BLOOD PRESSURE: 78 MMHG

## 2018-11-28 LAB
ANION GAP SERPL CALC-SCNC: 8 MMOL/L — SIGNIFICANT CHANGE UP (ref 5–17)
BUN SERPL-MCNC: 25 MG/DL — HIGH (ref 7–18)
CALCIUM SERPL-MCNC: 8.4 MG/DL — SIGNIFICANT CHANGE UP (ref 8.4–10.5)
CHLORIDE SERPL-SCNC: 108 MMOL/L — SIGNIFICANT CHANGE UP (ref 96–108)
CO2 SERPL-SCNC: 25 MMOL/L — SIGNIFICANT CHANGE UP (ref 22–31)
CREAT SERPL-MCNC: 0.88 MG/DL — SIGNIFICANT CHANGE UP (ref 0.5–1.3)
GLUCOSE BLDC GLUCOMTR-MCNC: 103 MG/DL — HIGH (ref 70–99)
GLUCOSE BLDC GLUCOMTR-MCNC: 115 MG/DL — HIGH (ref 70–99)
GLUCOSE BLDC GLUCOMTR-MCNC: 182 MG/DL — HIGH (ref 70–99)
GLUCOSE SERPL-MCNC: 98 MG/DL — SIGNIFICANT CHANGE UP (ref 70–99)
HCT VFR BLD CALC: 31.2 % — LOW (ref 34.5–45)
HGB BLD-MCNC: 9.1 G/DL — LOW (ref 11.5–15.5)
MCHC RBC-ENTMCNC: 21.2 PG — LOW (ref 27–34)
MCHC RBC-ENTMCNC: 29.3 GM/DL — LOW (ref 32–36)
MCV RBC AUTO: 72.6 FL — LOW (ref 80–100)
PLATELET # BLD AUTO: 218 K/UL — SIGNIFICANT CHANGE UP (ref 150–400)
POTASSIUM SERPL-MCNC: 4.4 MMOL/L — SIGNIFICANT CHANGE UP (ref 3.5–5.3)
POTASSIUM SERPL-SCNC: 4.4 MMOL/L — SIGNIFICANT CHANGE UP (ref 3.5–5.3)
RBC # BLD: 4.3 M/UL — SIGNIFICANT CHANGE UP (ref 3.8–5.2)
RBC # FLD: 17.6 % — HIGH (ref 10.3–14.5)
SODIUM SERPL-SCNC: 141 MMOL/L — SIGNIFICANT CHANGE UP (ref 135–145)
WBC # BLD: 8 K/UL — SIGNIFICANT CHANGE UP (ref 3.8–10.5)
WBC # FLD AUTO: 8 K/UL — SIGNIFICANT CHANGE UP (ref 3.8–10.5)

## 2018-11-28 PROCEDURE — 82746 ASSAY OF FOLIC ACID SERUM: CPT

## 2018-11-28 PROCEDURE — 82550 ASSAY OF CK (CPK): CPT

## 2018-11-28 PROCEDURE — 83550 IRON BINDING TEST: CPT

## 2018-11-28 PROCEDURE — 85610 PROTHROMBIN TIME: CPT

## 2018-11-28 PROCEDURE — 83735 ASSAY OF MAGNESIUM: CPT

## 2018-11-28 PROCEDURE — 84484 ASSAY OF TROPONIN QUANT: CPT

## 2018-11-28 PROCEDURE — 86850 RBC ANTIBODY SCREEN: CPT

## 2018-11-28 PROCEDURE — 82009 KETONE BODYS QUAL: CPT

## 2018-11-28 PROCEDURE — 80061 LIPID PANEL: CPT

## 2018-11-28 PROCEDURE — 80048 BASIC METABOLIC PNL TOTAL CA: CPT

## 2018-11-28 PROCEDURE — 82962 GLUCOSE BLOOD TEST: CPT

## 2018-11-28 PROCEDURE — 84443 ASSAY THYROID STIM HORMONE: CPT

## 2018-11-28 PROCEDURE — 86901 BLOOD TYPING SEROLOGIC RH(D): CPT

## 2018-11-28 PROCEDURE — 85730 THROMBOPLASTIN TIME PARTIAL: CPT

## 2018-11-28 PROCEDURE — 97162 PT EVAL MOD COMPLEX 30 MIN: CPT

## 2018-11-28 PROCEDURE — 86900 BLOOD TYPING SEROLOGIC ABO: CPT

## 2018-11-28 PROCEDURE — 83880 ASSAY OF NATRIURETIC PEPTIDE: CPT

## 2018-11-28 PROCEDURE — 84480 ASSAY TRIIODOTHYRONINE (T3): CPT

## 2018-11-28 PROCEDURE — 83036 HEMOGLOBIN GLYCOSYLATED A1C: CPT

## 2018-11-28 PROCEDURE — 96374 THER/PROPH/DIAG INJ IV PUSH: CPT

## 2018-11-28 PROCEDURE — 82607 VITAMIN B-12: CPT

## 2018-11-28 PROCEDURE — 84436 ASSAY OF TOTAL THYROXINE: CPT

## 2018-11-28 PROCEDURE — 96372 THER/PROPH/DIAG INJ SC/IM: CPT | Mod: XU

## 2018-11-28 PROCEDURE — 93017 CV STRESS TEST TRACING ONLY: CPT

## 2018-11-28 PROCEDURE — 81003 URINALYSIS AUTO W/O SCOPE: CPT

## 2018-11-28 PROCEDURE — 93005 ELECTROCARDIOGRAM TRACING: CPT

## 2018-11-28 PROCEDURE — 83690 ASSAY OF LIPASE: CPT

## 2018-11-28 PROCEDURE — 82728 ASSAY OF FERRITIN: CPT

## 2018-11-28 PROCEDURE — 78452 HT MUSCLE IMAGE SPECT MULT: CPT

## 2018-11-28 PROCEDURE — 71045 X-RAY EXAM CHEST 1 VIEW: CPT

## 2018-11-28 PROCEDURE — 99291 CRITICAL CARE FIRST HOUR: CPT | Mod: 25

## 2018-11-28 PROCEDURE — 85027 COMPLETE CBC AUTOMATED: CPT

## 2018-11-28 PROCEDURE — 80053 COMPREHEN METABOLIC PANEL: CPT

## 2018-11-28 PROCEDURE — 83540 ASSAY OF IRON: CPT

## 2018-11-28 PROCEDURE — 82553 CREATINE MB FRACTION: CPT

## 2018-11-28 PROCEDURE — A9502: CPT

## 2018-11-28 RX ORDER — NYSTATIN CREAM 100000 [USP'U]/G
1 CREAM TOPICAL
Qty: 0 | Refills: 0 | Status: DISCONTINUED | OUTPATIENT
Start: 2018-11-28 | End: 2018-11-28

## 2018-11-28 RX ORDER — APIXABAN 2.5 MG/1
1 TABLET, FILM COATED ORAL
Qty: 30 | Refills: 0 | OUTPATIENT
Start: 2018-11-28 | End: 2018-12-27

## 2018-11-28 RX ORDER — FERROUS SULFATE 325(65) MG
1 TABLET ORAL
Qty: 6 | Refills: 0 | OUTPATIENT
Start: 2018-11-28 | End: 2018-11-30

## 2018-11-28 RX ORDER — DIGOXIN 250 MCG
0.12 TABLET ORAL DAILY
Qty: 0 | Refills: 0 | Status: DISCONTINUED | OUTPATIENT
Start: 2018-11-28 | End: 2018-11-28

## 2018-11-28 RX ORDER — CARVEDILOL PHOSPHATE 80 MG/1
1 CAPSULE, EXTENDED RELEASE ORAL
Qty: 0 | Refills: 0 | COMMUNITY
Start: 2018-11-28

## 2018-11-28 RX ORDER — DIGOXIN 250 MCG
1 TABLET ORAL
Qty: 30 | Refills: 0 | OUTPATIENT
Start: 2018-11-28 | End: 2018-12-27

## 2018-11-28 RX ADMIN — Medication 325 MILLIGRAM(S): at 18:11

## 2018-11-28 RX ADMIN — ENOXAPARIN SODIUM 80 MILLIGRAM(S): 100 INJECTION SUBCUTANEOUS at 07:42

## 2018-11-28 RX ADMIN — Medication 81 MILLIGRAM(S): at 12:15

## 2018-11-28 RX ADMIN — LOSARTAN POTASSIUM 100 MILLIGRAM(S): 100 TABLET, FILM COATED ORAL at 07:38

## 2018-11-28 RX ADMIN — CARVEDILOL PHOSPHATE 25 MILLIGRAM(S): 80 CAPSULE, EXTENDED RELEASE ORAL at 18:10

## 2018-11-28 RX ADMIN — NYSTATIN CREAM 1 APPLICATION(S): 100000 CREAM TOPICAL at 07:38

## 2018-11-28 RX ADMIN — Medication 325 MILLIGRAM(S): at 07:37

## 2018-11-28 RX ADMIN — METFORMIN HYDROCHLORIDE 500 MILLIGRAM(S): 850 TABLET ORAL at 18:10

## 2018-11-28 RX ADMIN — NYSTATIN CREAM 1 APPLICATION(S): 100000 CREAM TOPICAL at 18:12

## 2018-11-28 RX ADMIN — METFORMIN HYDROCHLORIDE 500 MILLIGRAM(S): 850 TABLET ORAL at 07:39

## 2018-11-28 RX ADMIN — CARVEDILOL PHOSPHATE 25 MILLIGRAM(S): 80 CAPSULE, EXTENDED RELEASE ORAL at 07:38

## 2018-11-28 NOTE — PROGRESS NOTE ADULT - SUBJECTIVE AND OBJECTIVE BOX
CHIEF COMPLAINT:Patient is a 83y old  Female who presents with a chief complaint of chest pain .Pt appears comfortable.    	  REVIEW OF SYSTEMS:  CONSTITUTIONAL: No fever, weight loss, or fatigue  EYES: No eye pain, visual disturbances, or discharge  ENT:  No difficulty hearing, tinnitus, vertigo; No sinus or throat pain  NECK: No pain or stiffness  RESPIRATORY: No cough, wheezing, chills or hemoptysis; No Shortness of Breath  CARDIOVASCULAR: No chest pain, palpitations, passing out, dizziness, or leg swelling  GASTROINTESTINAL: No abdominal or epigastric pain. No nausea, vomiting, or hematemesis; No diarrhea or constipation. No melena or hematochezia.  GENITOURINARY: No dysuria, frequency, hematuria, or incontinence  NEUROLOGICAL: No headaches, memory loss, loss of strength, numbness, or tremors  SKIN: No itching, burning, rashes, or lesions   LYMPH Nodes: No enlarged glands  ENDOCRINE: No heat or cold intolerance; No hair loss  MUSCULOSKELETAL: No joint pain or swelling; No muscle, back, or extremity pain  PSYCHIATRIC: No depression, anxiety, mood swings, or difficulty sleeping  HEME/LYMPH: No easy bruising, or bleeding gums  ALLERGY AND IMMUNOLOGIC: No hives or eczema	      PHYSICAL EXAM:  T(C): 36.7 (11-28-18 @ 07:20), Max: 36.9 (11-27-18 @ 21:19)  HR: 98 (11-28-18 @ 07:20) (78 - 98)  BP: 144/82 (11-28-18 @ 07:20) (128/68 - 165/97)  RR: 18 (11-28-18 @ 07:20) (16 - 18)  SpO2: 98% (11-28-18 @ 07:20) (97% - 99%)      Appearance: Normal	  HEENT:   Normal oral mucosa, PERRL, EOMI	  Lymphatic: No lymphadenopathy  Cardiovascular: Normal S1 S2, No JVD, No murmurs, No edema  Respiratory: Lungs clear to auscultation	  Psychiatry: A & O x 3, Mood & affect appropriate  Gastrointestinal:  Soft, Non-tender, + BS	  Skin: No rashes, No ecchymoses, No cyanosis	  Neurologic: Non-focal  Extremities: Normal range of motion, No clubbing, cyanosis or edema  Vascular: Peripheral pulses palpable 2+ bilaterally    MEDICATIONS  (STANDING):  aspirin  chewable 81 milliGRAM(s) Oral daily  atorvastatin 40 milliGRAM(s) Oral at bedtime  carvedilol 25 milliGRAM(s) Oral every 12 hours  enoxaparin Injectable 80 milliGRAM(s) SubCutaneous <User Schedule>  ferrous    sulfate 325 milliGRAM(s) Oral two times a day  insulin lispro (HumaLOG) corrective regimen sliding scale   SubCutaneous Before meals and at bedtime  losartan 100 milliGRAM(s) Oral daily  metFORMIN 500 milliGRAM(s) Oral two times a day  nystatin Powder 1 Application(s) Topical two times a day      TELEMETRY: 	  afib 70-90's    	  LABS:	 	    CARDIAC MARKERS:  CARDIAC MARKERS ( 27 Nov 2018 10:22 )  0.037 ng/mL / x     / x     / x     / x      CARDIAC MARKERS ( 27 Nov 2018 05:32 )  x     / x     / 39 U/L / x     / 1.2 ng/mL  CARDIAC MARKERS ( 27 Nov 2018 04:11 )  0.025 ng/mL / x     / x     / x     / x      CARDIAC MARKERS ( 26 Nov 2018 17:01 )  0.031 ng/mL / x     / 48 U/L / x     / x                             9.1    8.0   )-----------( 218      ( 28 Nov 2018 07:05 )             31.2     11-28    141  |  108  |  25<H>  ----------------------------<  98  4.4   |  25  |  0.88    Ca    8.4      28 Nov 2018 07:05  Mg     1.9     11-26    TPro  7.8  /  Alb  3.3<L>  /  TBili  0.5  /  DBili  x   /  AST  22  /  ALT  18  /  AlkPhos  59  11-26    proBNP: Serum Pro-Brain Natriuretic Peptide: 5231 pg/mL (11-26 @ 17:01)    Lipid Profile: Cholesterol 105  LDL 46  HDL 44  TG 75    HgA1c: Hemoglobin A1C, Whole Blood: 6.8 % (11-27 @ 09:54)    TSH: Thyroid Stimulating Hormone, Serum: 1.65 uU/mL (11-26 @ 17:01)

## 2018-11-28 NOTE — DISCHARGE NOTE ADULT - CARE PLAN
Principal Discharge DX:	Chest pain  Goal:	to rule out acute coronary syndrome  Assessment and plan of treatment:	- You presented with left chest pain radiating to back  EKG noted atrial fibrillation   Cardiac enzymes were negative   Stress test was negative for ishemia  - You need to continue on Aspirin , lipitor and carvedilol   cardio consulted DR. Lisa  - You are medically stable to be discharged from the hospital.  - You need to follow up with your Primary Care Physician and cardiologist in 2 days  - You need to continue your medications regularly as prescribed.  Secondary Diagnosis:	New onset atrial fibrillation  Assessment and plan of treatment:	- You presented with left chest pain radiating to back  EKG noted atrial fibrillation   Cardiac enzymes were negative   Stress test was negative for ishemia  - You need to continue on Aspirin , lipitor and carvedilol   cardio consulted DR. Lisa  You were started on digoxin   and gonna be discharge on Oral anticoagulant Eliquis  - You are medically stable to be discharged from the hospital.  - You need to follow up with your Primary Care Physician and cardiologist in 2 days  - You need to continue your medications regularly as prescribed.  Secondary Diagnosis:	Diabetes  Goal:	Maintaining blood glucose level within normal range.  Assessment and plan of treatment:	- You have a history of diabetes  - Your HbA1c is 6.8  - You should continue to take your medication regimen regularly as prescribed  - Please follow up with your primary care provider/endocrinologist within a week of discharge.  - You need to continue monitoring your blood sugar levels closely.  - Please maintain healthy lifestyle by eating healthy diabetic regimen, weight loss and exercise regularly as tolerated.  Secondary Diagnosis:	HTN (hypertension)  Goal:	Blood Pressure Control , Please continue current medication regimen, and follow up with your PCP  Assessment and plan of treatment:	- You have a history of Hypertension.   - Your Blood Pressure was adequately controlled with Losartan , Coreg  - You should continue on the current antihypertensive regimen regularly.  - You blood pressure should be within 140-120/80-90.  - You should follow-up with your PCP within 1 week of your discharge.   - You should maintain healthy lifestyle by eating healthy low salt diet, avoid fatty food, weight loss, exercise regularly as tolerated 30 mins X 3 time per week.  Secondary Diagnosis:	Hypercholesterolemia  Goal:	maintain normal lipid level. (LDL < 100)  Assessment and plan of treatment:	Please take your medication Lipitor 40mg regularly   Please maintain healthy lifestyle by eating healthy as mentioned above, exercise regularly and maintain weight.   Please Follow up with your doctor in 1 week.

## 2018-11-28 NOTE — PROGRESS NOTE ADULT - PROBLEM SELECTOR PLAN 3
Cardiology F/U
Cont current Rx
FS F/U
on glimpiride and metformin at home  hold po meds  on HISS  f/u a1c   f/u FS ac/hs

## 2018-11-28 NOTE — DISCHARGE NOTE ADULT - PLAN OF CARE
to rule out acute coronary syndrome - You presented with left chest pain radiating to back  EKG noted atrial fibrillation   Cardiac enzymes were negative   Stress test was negative for ishemia  - You need to continue on Aspirin , lipitor and carvedilol   cardio consulted DR. Lisa  - You are medically stable to be discharged from the hospital.  - You need to follow up with your Primary Care Physician and cardiologist in 2 days  - You need to continue your medications regularly as prescribed. - You presented with left chest pain radiating to back  EKG noted atrial fibrillation   Cardiac enzymes were negative   Stress test was negative for ishemia  - You need to continue on Aspirin , lipitor and carvedilol   cardio consulted DR. Lisa  You were started on digoxin   and gonna be discharge on Oral anticoagulant Eliquis  - You are medically stable to be discharged from the hospital.  - You need to follow up with your Primary Care Physician and cardiologist in 2 days  - You need to continue your medications regularly as prescribed. Maintaining blood glucose level within normal range. - You have a history of diabetes  - Your HbA1c is 6.8  - You should continue to take your medication regimen regularly as prescribed  - Please follow up with your primary care provider/endocrinologist within a week of discharge.  - You need to continue monitoring your blood sugar levels closely.  - Please maintain healthy lifestyle by eating healthy diabetic regimen, weight loss and exercise regularly as tolerated. Blood Pressure Control , Please continue current medication regimen, and follow up with your PCP - You have a history of Hypertension.   - Your Blood Pressure was adequately controlled with Losartan , Coreg  - You should continue on the current antihypertensive regimen regularly.  - You blood pressure should be within 140-120/80-90.  - You should follow-up with your PCP within 1 week of your discharge.   - You should maintain healthy lifestyle by eating healthy low salt diet, avoid fatty food, weight loss, exercise regularly as tolerated 30 mins X 3 time per week. maintain normal lipid level. (LDL < 100) Please take your medication Lipitor 40mg regularly   Please maintain healthy lifestyle by eating healthy as mentioned above, exercise regularly and maintain weight.   Please Follow up with your doctor in 1 week.

## 2018-11-28 NOTE — PROGRESS NOTE ADULT - ASSESSMENT
84 yo female lives alone, with PMH of DM, HTN  and HLD BIBA c/o left chest pain and new onset afib while for stress test and sent to ER.  1.Tele monitoring.  2.Obtain copy of echo.  3.Afib-coreg,lovenox,add dig .125mg qd.  4.HTN-cozaar.  5.DM-insulin.  6.Cont asa,statin.  7.PPI.  8.Lipid d/o-statin.  9.Stress test-R/O ischemia.

## 2018-11-28 NOTE — PROGRESS NOTE ADULT - PROBLEM SELECTOR PLAN 1
Cardiac enzyme  Telemetry
Cardiology F/U as outpatient
On telemetry  cardiology consult appreciated.
p/w left cp radiating to back, 10/10, lasting 30mins  heart score 4   EKG noted a fib with RVR @ 100bpm, TWI in lead L   Troponin negative x1   will r/o ACS   on telemetry  on asa, statin and carvedilol   F/U TTE and cardiac enzyme  cardio consulted DR. Lisa

## 2018-11-28 NOTE — DISCHARGE NOTE ADULT - HOSPITAL COURSE
82 yo female lives alone, with PMH of DM, HTN  and HLD BIBA c/o left chest pain. Pt went to cardiologist DR. Bray for echo & cardiac evaluation today. While in the office, pt developed chest pain and radiated to back, "fire" pain, 10/10, lasting 30mins . Pt also c/o dizziness, denies diaphoresis, EKG done showed new onset A.fib and then pt was sent to ED. Pt denies headache, abd pain, n/v/d/c or any other complains. In ED, pt initial vitals noted bp 178/118, , labs noted Hb 10. 2,  CXR unremarkable. s/p metoprolol 5mg iv x1 and lovenox 90mg iv x1 in ED. Currently chest pain and back pain improved . EKG noted a fib with RVR @ 100bpm,Troponins were negative   cardio consulted Dr. Lisa. patient started on digoxin and eliquis .Given patient's improved clinical status and current hemodynamic stability, decision was made to discharge the patient. Patient is stable for discharge per attending and is advised to follow up with PCP as outpatient . Please refer to patient's complete medical chart with documents for a full hospital course, for this is only a brief summary.

## 2018-11-28 NOTE — DISCHARGE NOTE ADULT - MEDICATION SUMMARY - MEDICATIONS TO TAKE
I will START or STAY ON the medications listed below when I get home from the hospital:    aspirin 81 mg oral tablet, chewable  -- 1 tab(s) by mouth once a day  -- Indication: For New onset atrial fibrillation    losartan 100 mg oral tablet  -- 1 tab(s) by mouth once a day  -- Indication: For HTN (hypertension)    digoxin 125 mcg (0.125 mg) oral tablet  -- 1 tab(s) by mouth once a day  -- Indication: For Atrial fibrillation    apixaban 2.5 mg oral tablet  -- 1 tab(s) by mouth once a day   -- Check with your doctor before becoming pregnant.  It is very important that you take or use this exactly as directed.  Do not skip doses or discontinue unless directed by your doctor.  Obtain medical advice before taking any non-prescription drugs as some may affect the action of this medication.    -- Indication: For Atrial fibrillation    glimepiride  -- 5 milligram(s) by mouth 2 times a day  -- Indication: For Diabetes    metFORMIN 500 mg oral tablet  -- 1 tab(s) by mouth 2 times a day  -- Indication: For Diabetes    meclizine 12.5 mg oral tablet  -- 1 tab(s) by mouth 3 times a day, As Needed  -- Indication: For Nausea    Crestor 10 mg oral tablet  -- 1 tab(s) by mouth once a day (at bedtime)  -- Indication: For Hypercholesterolemia    carvedilol 25 mg oral tablet  -- 1 tab(s) by mouth every 12 hours  -- Indication: For HTN (hypertension)    FeroSul 325 mg (65 mg elemental iron) oral tablet  -- 1 tab(s) by mouth 2 times a day   -- Indication: For Anemia    Senna 8.6 mg oral tablet  -- 3 tab(s) by mouth once a day (at bedtime)  -- Indication: For Constipation I will START or STAY ON the medications listed below when I get home from the hospital:    aspirin 81 mg oral tablet, chewable  -- 1 tab(s) by mouth once a day  -- Indication: For New onset atrial fibrillation    losartan 100 mg oral tablet  -- 1 tab(s) by mouth once a day  -- Indication: For HTN (hypertension)    digoxin 125 mcg (0.125 mg) oral tablet  -- 1 tab(s) by mouth once a day  -- Indication: For Atrial fibrillation    apixaban 2.5 mg oral tablet  -- 1 tab(s) by mouth once a day   -- Check with your doctor before becoming pregnant.  It is very important that you take or use this exactly as directed.  Do not skip doses or discontinue unless directed by your doctor.  Obtain medical advice before taking any non-prescription drugs as some may affect the action of this medication.    -- Indication: For Atrial fibrillation    glimepiride  -- 5 milligram(s) by mouth 2 times a day  -- Indication: For Diabetes    metFORMIN 500 mg oral tablet  -- 1 tab(s) by mouth 2 times a day  -- Indication: For Diabetes    meclizine 12.5 mg oral tablet  -- 1 tab(s) by mouth 3 times a day, As Needed  -- Indication: For Nausea    Crestor 10 mg oral tablet  -- 1 tab(s) by mouth once a day (at bedtime)  -- Indication: For Hypercholesterolemia    carvedilol 25 mg oral tablet  -- 1 tab(s) by mouth every 12 hours  -- Indication: For HTN (hypertension)    FeroSul 325 mg (65 mg elemental iron) oral tablet  -- 1 tab(s) by mouth 2 times a day   -- Indication: For Need for prophylactic measure    Senna 8.6 mg oral tablet  -- 3 tab(s) by mouth once a day (at bedtime)  -- Indication: For Constipation

## 2018-11-28 NOTE — PROGRESS NOTE ADULT - PROBLEM SELECTOR PLAN 2
BP F/U
Cardiology F/u
Cont current meds
EKG noted a fib with RVR @ 100bpm, TWI in lead L   Troponin negative x1   chadsvac score 5, pt need AC   s/p lovenox 90mg x1 in ED  C/W full dose lovenox  c/w carvedilol   on telemetry

## 2018-11-28 NOTE — PROGRESS NOTE ADULT - ASSESSMENT
84 yo female lives alone, with PMH of DM, HTN  and HLD BIBA c/w left chest pain. Pt went to cardiologist DR. Bray for echo & cardiac evaluation today. While in the office, pt developed chest pain and radiated to back, "fire" pain, 10/10, lasting 30mins. Pt also c/o dizziness, denies diaphoresis, EKG done showed new onset A.fib and then pt was sent to ED. In ED, pt initial vitals noted bp 178/118, , labs noted Hb 10. 2,  CXR unremarkable. s/p metoprolol 5mg iv x1 and lovenox 90mg iv x1 in ED. Currently chest pain and back pain improved.

## 2018-11-28 NOTE — DISCHARGE NOTE ADULT - PATIENT PORTAL LINK FT
You can access the [facility], offered by [facility], by registering with the following website: [email or web address]

## 2018-11-28 NOTE — PROGRESS NOTE ADULT - SUBJECTIVE AND OBJECTIVE BOX
PGY1 Note discussed with Supervising Resident and Primary Attending.    Patient is a 83y old  Female who presents with a chief complaint of chest pain (2018 09:53)      INTERVAL HPI/OVERNIGHT EVENTS :    MEDICATIONS  (STANDING):  aspirin  chewable 81 milliGRAM(s) Oral daily  atorvastatin 40 milliGRAM(s) Oral at bedtime  carvedilol 25 milliGRAM(s) Oral every 12 hours  digoxin     Tablet 0.125 milliGRAM(s) Oral daily  enoxaparin Injectable 80 milliGRAM(s) SubCutaneous <User Schedule>  ferrous    sulfate 325 milliGRAM(s) Oral two times a day  insulin lispro (HumaLOG) corrective regimen sliding scale   SubCutaneous Before meals and at bedtime  losartan 100 milliGRAM(s) Oral daily  metFORMIN 500 milliGRAM(s) Oral two times a day  nystatin Powder 1 Application(s) Topical two times a day    MEDICATIONS  (PRN):  meclizine 12.5 milliGRAM(s) Oral three times a day PRN Dizziness      Allergies    No Known Allergies    Intolerances        REVIEW OF SYSTEMS :  * CONSTITUTIONAL      : No Fever, Weight loss, or Fatigue  * EYES                             : No eye pain , Visual disturbances or Discharge  * RESPIRATORY             : No Cough, Wheezing, Chills or Hemoptysis; No shortness of breath  * CARDIOVASCULAR     : No Chest pain, Palpitations, Dizziness, or Leg swelling  * GASTROINTESTINAL  : No Abdominal or Epigastric pain. No Nausea, Vomiting or Hematemesis; No Diarrhea or Constipation. No Melena or Hematochezia.  * GENITOURINARY        : No Dysuria , Frequency , Haematuria   * NEUROLOGICAL          : No Headaches, Memory loss, Loss of trength, Numbness, or Tremors  * MUSCULOSKELETAL   : No Joint pain  * PSYCHIATRY                 : No Depression or Anxiety   * HEME/LYMPH              : No Easy Bruising or Bleeding gums  * SKIN                               : No Itching, Burning, Rashes, or Lesions     Vital Signs Last 24 Hrs  T(C): 36.7 (2018 07:20), Max: 36.9 (2018 21:19)  T(F): 98.1 (2018 07:20), Max: 98.4 (2018 21:19)  HR: 98 (2018 07:20) (78 - 98)  BP: 144/82 (2018 07:20) (128/68 - 165/97)  BP(mean): --  RR: 18 (2018 07:20) (16 - 18)  SpO2: 98% (2018 07:20) (97% - 99%)    PHYSICAL EXAM :  * GENERAL                 : NAD, Well-groomed, Well-developed  * HEAD                       :  Atraumatic, Normocephalic  * EYES                         : EOMI, PERRLA, Conjunctiva and Sclera clear  * ENT                           : Moist Mucous Membranes  * NECK                         : Supple, No JVD, Normal Thyroid  * CHEST/LUNG           : Clear to Auscultation bilaterally; No Rales, Rhonchi, Wheezing or Rubs  * HEART                       : Regular Rate and Rhythm; No murmurs, Rubs or gallops  * ABDOMEN                : Soft, Non-tender, Non-distended; Bowel Sounds present  * NERVOUS SYSTEM  :  Alert & Oriented X3, Good Concentration; Motor Strength 5/5 B/L UL LL ; DTRs 2+ Intact and Symmetric  * EXTREMITIES            :  2+ Peripheral Pulses, No clubbing, cyanosis, or edema  * SKIN                           : No Rashes or Lesions    LABS:                          9.1    8.0   )-----------( 218      ( 2018 07:05 )             31.2         141  |  108  |  25<H>  ----------------------------<  98  4.4   |  25  |  0.88    Ca    8.4      2018 07:05  Mg     1.9         TPro  7.8  /  Alb  3.3<L>  /  TBili  0.5  /  DBili  x   /  AST  22  /  ALT  18  /  AlkPhos  59      PT/INR - ( 2018 17:01 )   PT: 14.6 sec;   INR: 1.30 ratio         PTT - ( 2018 17:01 )  PTT:30.9 sec  Urinalysis Basic - ( 2018 17:01 )    Color: Yellow / Appearance: Clear / S.010 / pH: x  Gluc: x / Ketone: Negative  / Bili: Negative / Urobili: Negative   Blood: x / Protein: Negative / Nitrite: Negative   Leuk Esterase: Negative / RBC: x / WBC x   Sq Epi: x / Non Sq Epi: x / Bacteria: x      CAPILLARY BLOOD GLUCOSE      POCT Blood Glucose.: 103 mg/dL (2018 08:20)  POCT Blood Glucose.: 124 mg/dL (2018 23:11)  POCT Blood Glucose.: 182 mg/dL (2018 16:28)  POCT Blood Glucose.: 315 mg/dL (2018 11:29)      RADIOLOGY & ADDITIONAL TESTS:   No radiological imaging was required    Imaging Personally Reviewed   :  [ ] YES  [ ] NO    Consultant(s) Notes Reviewed :  [ ] YES  [ ] NO

## 2018-11-28 NOTE — PROGRESS NOTE ADULT - SUBJECTIVE AND OBJECTIVE BOX
PEPE YO  83y  Female      Patient is a 83y old  Female who presents with a chief complaint of chest pain (28 Nov 2018 11:13)      INTERVAL HPI/OVERNIGHT EVENTS:Chest pain improved        REVIEW OF SYSTEMS:  CONSTITUTIONAL: No fever, weight loss, + fatigue  EYES: No eye pain, visual disturbances, or discharge  ENMT:   No sinus or throat pain  NECK: No pain or stiffness  BREASTS: No pain, masses, or nipple discharge  RESPIRATORY: No cough, wheezing, chills or hemoptysis; No shortness of breath  CARDIOVASCULAR: No chest pain, palpitations, dizziness, or leg swelling  GASTROINTESTINAL: No abdominal or epigastric pain. No nausea, vomiting, or hematemesis; No diarrhea or constipation. No melena or hematochezia.  GENITOURINARY: No hematuria  NEUROLOGICAL:AxOx3    SKIN: No itching, burning, rashes, or lesions   LYMPH NODES: No enlarged glands  ENDOCRINE: No heat or cold intolerance; No hair loss  MUSCULOSKELETAL: No joint pain    HEME/LYMPH: No easy bruising, or bleeding gums  ALLERY AND IMMUNOLOGIC: No hives or eczema    T(C): 36.3 (11-28-18 @ 15:38), Max: 36.9 (11-27-18 @ 21:19)  HR: 85 (11-28-18 @ 18:13) (76 - 98)  BP: 164/78 (11-28-18 @ 18:13) (144/82 - 180/66)  RR: 18 (11-28-18 @ 15:38) (16 - 18)  SpO2: 99% (11-28-18 @ 15:38) (97% - 99%)  Wt(kg): --Vital Signs Last 24 Hrs  T(C): 36.3 (28 Nov 2018 15:38), Max: 36.9 (27 Nov 2018 21:19)  T(F): 97.4 (28 Nov 2018 15:38), Max: 98.4 (27 Nov 2018 21:19)  HR: 85 (28 Nov 2018 18:13) (76 - 98)  BP: 164/78 (28 Nov 2018 18:13) (144/82 - 180/66)  BP(mean): --  RR: 18 (28 Nov 2018 15:38) (16 - 18)  SpO2: 99% (28 Nov 2018 15:38) (97% - 99%)    PHYSICAL EXAM:  GENERAL: NAD, well-groomed, well-developed  HEAD:  Atraumatic, Normocephalic  EYES: EOMI, PERRLA, conjunctiva and sclera clear  ENMT: No tonsillar erythema, exudates, or enlargement; Moist mucous membranes, Good dentition, No lesions  NECK: Supple, No JVD, Normal thyroid  NERVOUS SYSTEM:  demented.no focal neurodeficit.  CHEST/LUNG: Clear to percussion bilaterally; No rales, rhonchi, wheezing, or rubs  HEART: Regular rate and rhythm; No murmurs, rubs, or gallops  ABDOMEN: Soft, Nontender, Nondistended; Bowel sounds present  EXTREMITIES:  + pedal edema  LYMPH: No lymphadenopathy noted  SKIN: No rashes or lesions    Consultant(s) Notes Reviewed:  [x ] YES  [ ] NO  Care Discussed with Consultants/Other Providers [ x] YES  [ ] NO    LABS:                        9.1    8.0   )-----------( 218      ( 28 Nov 2018 07:05 )             31.2     11-28    141  |  108  |  25<H>  ----------------------------<  98  4.4   |  25  |  0.88    Ca    8.4      28 Nov 2018 07:05          CAPILLARY BLOOD GLUCOSE      POCT Blood Glucose.: 115 mg/dL (28 Nov 2018 17:03)  POCT Blood Glucose.: 182 mg/dL (28 Nov 2018 12:08)  POCT Blood Glucose.: 103 mg/dL (28 Nov 2018 08:20)  POCT Blood Glucose.: 124 mg/dL (27 Nov 2018 23:11)            RADIOLOGY & ADDITIONAL TESTS:    Imaging Personally Reviewed:  [ ] YES  [ ] NO

## 2018-11-29 RX ORDER — CARVEDILOL PHOSPHATE 80 MG/1
1 CAPSULE, EXTENDED RELEASE ORAL
Qty: 0 | Refills: 0 | COMMUNITY

## 2018-11-29 NOTE — CHART NOTE - NSCHARTNOTEFT_GEN_A_CORE
Pt medications were updated according to cardiologist Dr. Lisa  I called pharmacy to give her Eliquis 5 mg twice per day   I called the patient to update her regarding her medication

## 2018-12-27 PROBLEM — I10 ESSENTIAL (PRIMARY) HYPERTENSION: Chronic | Status: ACTIVE | Noted: 2018-11-26

## 2018-12-27 PROBLEM — E78.00 PURE HYPERCHOLESTEROLEMIA, UNSPECIFIED: Chronic | Status: ACTIVE | Noted: 2018-11-26

## 2019-01-01 ENCOUNTER — OUTPATIENT (OUTPATIENT)
Dept: OUTPATIENT SERVICES | Facility: HOSPITAL | Age: 84
LOS: 1 days | End: 2019-01-01
Payer: MEDICARE

## 2019-01-01 PROCEDURE — G9001: CPT

## 2019-01-01 NOTE — PATIENT PROFILE ADULT - NSPROHMDIABETMGMTSTRAT_GEN_A_NUR
medication therapy

## 2019-01-08 ENCOUNTER — EMERGENCY (EMERGENCY)
Facility: HOSPITAL | Age: 84
LOS: 1 days | Discharge: HOME | End: 2019-01-08
Attending: EMERGENCY MEDICINE | Admitting: EMERGENCY MEDICINE
Payer: MEDICARE

## 2019-01-08 VITALS
DIASTOLIC BLOOD PRESSURE: 112 MMHG | TEMPERATURE: 98 F | RESPIRATION RATE: 16 BRPM | SYSTOLIC BLOOD PRESSURE: 197 MMHG | OXYGEN SATURATION: 97 % | HEART RATE: 67 BPM

## 2019-01-08 VITALS
TEMPERATURE: 98 F | OXYGEN SATURATION: 99 % | DIASTOLIC BLOOD PRESSURE: 55 MMHG | HEART RATE: 75 BPM | RESPIRATION RATE: 20 BRPM | SYSTOLIC BLOOD PRESSURE: 162 MMHG

## 2019-01-08 LAB
ALBUMIN SERPL ELPH-MCNC: 3.7 G/DL — SIGNIFICANT CHANGE UP (ref 3.3–5)
ALP SERPL-CCNC: 48 U/L — SIGNIFICANT CHANGE UP (ref 40–120)
ALT FLD-CCNC: 13 U/L — SIGNIFICANT CHANGE UP (ref 4–33)
APPEARANCE UR: CLEAR — SIGNIFICANT CHANGE UP
APTT BLD: 37.3 SEC — HIGH (ref 27.5–36.3)
AST SERPL-CCNC: 21 U/L — SIGNIFICANT CHANGE UP (ref 4–32)
B PERT DNA SPEC QL NAA+PROBE: NOT DETECTED — SIGNIFICANT CHANGE UP
BACTERIA # UR AUTO: NEGATIVE — SIGNIFICANT CHANGE UP
BASE EXCESS BLDV CALC-SCNC: 0.7 MMOL/L — SIGNIFICANT CHANGE UP
BASOPHILS # BLD AUTO: 0.05 K/UL — SIGNIFICANT CHANGE UP (ref 0–0.2)
BASOPHILS NFR BLD AUTO: 0.7 % — SIGNIFICANT CHANGE UP (ref 0–2)
BILIRUB SERPL-MCNC: 0.4 MG/DL — SIGNIFICANT CHANGE UP (ref 0.2–1.2)
BILIRUB UR-MCNC: NEGATIVE — SIGNIFICANT CHANGE UP
BLOOD GAS VENOUS - CREATININE: 0.59 MG/DL — SIGNIFICANT CHANGE UP (ref 0.5–1.3)
BLOOD UR QL VISUAL: NEGATIVE — SIGNIFICANT CHANGE UP
BUN SERPL-MCNC: 20 MG/DL — SIGNIFICANT CHANGE UP (ref 7–23)
C PNEUM DNA SPEC QL NAA+PROBE: NOT DETECTED — SIGNIFICANT CHANGE UP
CALCIUM SERPL-MCNC: 9.4 MG/DL — SIGNIFICANT CHANGE UP (ref 8.4–10.5)
CHLORIDE BLDV-SCNC: 108 MMOL/L — SIGNIFICANT CHANGE UP (ref 96–108)
CHLORIDE SERPL-SCNC: 103 MMOL/L — SIGNIFICANT CHANGE UP (ref 98–107)
CO2 SERPL-SCNC: 24 MMOL/L — SIGNIFICANT CHANGE UP (ref 22–31)
COLOR SPEC: SIGNIFICANT CHANGE UP
CORTIS SERPL-MCNC: 9.8 UG/DL — SIGNIFICANT CHANGE UP (ref 2.7–18.4)
CREAT SERPL-MCNC: 0.79 MG/DL — SIGNIFICANT CHANGE UP (ref 0.5–1.3)
DIGOXIN SERPL-MCNC: 1.2 NG/ML — SIGNIFICANT CHANGE UP (ref 0.8–2)
EOSINOPHIL # BLD AUTO: 0.11 K/UL — SIGNIFICANT CHANGE UP (ref 0–0.5)
EOSINOPHIL NFR BLD AUTO: 1.4 % — SIGNIFICANT CHANGE UP (ref 0–6)
FLUAV H1 2009 PAND RNA SPEC QL NAA+PROBE: NOT DETECTED — SIGNIFICANT CHANGE UP
FLUAV H1 RNA SPEC QL NAA+PROBE: NOT DETECTED — SIGNIFICANT CHANGE UP
FLUAV H3 RNA SPEC QL NAA+PROBE: NOT DETECTED — SIGNIFICANT CHANGE UP
FLUAV SUBTYP SPEC NAA+PROBE: NOT DETECTED — SIGNIFICANT CHANGE UP
FLUBV RNA SPEC QL NAA+PROBE: NOT DETECTED — SIGNIFICANT CHANGE UP
GAS PNL BLDV: 132 MMOL/L — LOW (ref 136–146)
GLUCOSE BLDV-MCNC: 86 — SIGNIFICANT CHANGE UP (ref 70–99)
GLUCOSE SERPL-MCNC: 57 MG/DL — LOW (ref 70–99)
GLUCOSE UR-MCNC: NEGATIVE — SIGNIFICANT CHANGE UP
HADV DNA SPEC QL NAA+PROBE: NOT DETECTED — SIGNIFICANT CHANGE UP
HCO3 BLDV-SCNC: 23 MMOL/L — SIGNIFICANT CHANGE UP (ref 20–27)
HCOV PNL SPEC NAA+PROBE: SIGNIFICANT CHANGE UP
HCT VFR BLD CALC: 35.7 % — SIGNIFICANT CHANGE UP (ref 34.5–45)
HCT VFR BLDV CALC: 28.4 % — LOW (ref 34.5–45)
HGB BLD-MCNC: 10 G/DL — LOW (ref 11.5–15.5)
HGB BLDV-MCNC: 9.1 G/DL — LOW (ref 11.5–15.5)
HMPV RNA SPEC QL NAA+PROBE: NOT DETECTED — SIGNIFICANT CHANGE UP
HPIV1 RNA SPEC QL NAA+PROBE: NOT DETECTED — SIGNIFICANT CHANGE UP
HPIV2 RNA SPEC QL NAA+PROBE: NOT DETECTED — SIGNIFICANT CHANGE UP
HPIV3 RNA SPEC QL NAA+PROBE: NOT DETECTED — SIGNIFICANT CHANGE UP
HPIV4 RNA SPEC QL NAA+PROBE: NOT DETECTED — SIGNIFICANT CHANGE UP
HYALINE CASTS # UR AUTO: NEGATIVE — SIGNIFICANT CHANGE UP
IMM GRANULOCYTES NFR BLD AUTO: 0.4 % — SIGNIFICANT CHANGE UP (ref 0–1.5)
INR BLD: 1.81 — HIGH (ref 0.88–1.17)
KETONES UR-MCNC: NEGATIVE — SIGNIFICANT CHANGE UP
LACTATE BLDV-MCNC: 1.3 MMOL/L — SIGNIFICANT CHANGE UP (ref 0.5–2)
LEUKOCYTE ESTERASE UR-ACNC: NEGATIVE — SIGNIFICANT CHANGE UP
LIDOCAIN IGE QN: 35.4 U/L — SIGNIFICANT CHANGE UP (ref 7–60)
LYMPHOCYTES # BLD AUTO: 1.22 K/UL — SIGNIFICANT CHANGE UP (ref 1–3.3)
LYMPHOCYTES # BLD AUTO: 16.1 % — SIGNIFICANT CHANGE UP (ref 13–44)
MCHC RBC-ENTMCNC: 20.1 PG — LOW (ref 27–34)
MCHC RBC-ENTMCNC: 28 % — LOW (ref 32–36)
MCV RBC AUTO: 71.7 FL — LOW (ref 80–100)
MONOCYTES # BLD AUTO: 0.72 K/UL — SIGNIFICANT CHANGE UP (ref 0–0.9)
MONOCYTES NFR BLD AUTO: 9.5 % — SIGNIFICANT CHANGE UP (ref 2–14)
NEUTROPHILS # BLD AUTO: 5.47 K/UL — SIGNIFICANT CHANGE UP (ref 1.8–7.4)
NEUTROPHILS NFR BLD AUTO: 71.9 % — SIGNIFICANT CHANGE UP (ref 43–77)
NITRITE UR-MCNC: NEGATIVE — SIGNIFICANT CHANGE UP
NRBC # FLD: 0 K/UL — LOW (ref 25–125)
PCO2 BLDV: 53 MMHG — HIGH (ref 41–51)
PH BLDV: 7.32 PH — SIGNIFICANT CHANGE UP (ref 7.32–7.43)
PH UR: 6.5 — SIGNIFICANT CHANGE UP (ref 5–8)
PLATELET # BLD AUTO: 208 K/UL — SIGNIFICANT CHANGE UP (ref 150–400)
PMV BLD: 11.6 FL — SIGNIFICANT CHANGE UP (ref 7–13)
PO2 BLDV: 21 MMHG — LOW (ref 35–40)
POTASSIUM BLDV-SCNC: 3.8 MMOL/L — SIGNIFICANT CHANGE UP (ref 3.4–4.5)
POTASSIUM SERPL-MCNC: 3.8 MMOL/L — SIGNIFICANT CHANGE UP (ref 3.5–5.3)
POTASSIUM SERPL-SCNC: 3.8 MMOL/L — SIGNIFICANT CHANGE UP (ref 3.5–5.3)
PROT SERPL-MCNC: 7.3 G/DL — SIGNIFICANT CHANGE UP (ref 6–8.3)
PROT UR-MCNC: 20 — SIGNIFICANT CHANGE UP
PROTHROM AB SERPL-ACNC: 20.5 SEC — HIGH (ref 9.8–13.1)
RBC # BLD: 4.98 M/UL — SIGNIFICANT CHANGE UP (ref 3.8–5.2)
RBC # FLD: 18.5 % — HIGH (ref 10.3–14.5)
RBC CASTS # UR COMP ASSIST: SIGNIFICANT CHANGE UP (ref 0–?)
RSV RNA SPEC QL NAA+PROBE: NOT DETECTED — SIGNIFICANT CHANGE UP
RV+EV RNA SPEC QL NAA+PROBE: NOT DETECTED — SIGNIFICANT CHANGE UP
SAO2 % BLDV: 21.3 % — LOW (ref 60–85)
SODIUM SERPL-SCNC: 137 MMOL/L — SIGNIFICANT CHANGE UP (ref 135–145)
SP GR SPEC: > 1.04 — HIGH (ref 1–1.04)
SQUAMOUS # UR AUTO: SIGNIFICANT CHANGE UP
T4 AB SER-ACNC: 10.01 UG/DL — SIGNIFICANT CHANGE UP (ref 5.1–13)
TSH SERPL-MCNC: 1.24 UIU/ML — SIGNIFICANT CHANGE UP (ref 0.27–4.2)
UROBILINOGEN FLD QL: NORMAL — SIGNIFICANT CHANGE UP
WBC # BLD: 7.6 K/UL — SIGNIFICANT CHANGE UP (ref 3.8–10.5)
WBC # FLD AUTO: 7.6 K/UL — SIGNIFICANT CHANGE UP (ref 3.8–10.5)
WBC UR QL: SIGNIFICANT CHANGE UP (ref 0–?)

## 2019-01-08 PROCEDURE — 99284 EMERGENCY DEPT VISIT MOD MDM: CPT | Mod: GC

## 2019-01-08 PROCEDURE — 74177 CT ABD & PELVIS W/CONTRAST: CPT | Mod: 26

## 2019-01-08 PROCEDURE — 71046 X-RAY EXAM CHEST 2 VIEWS: CPT | Mod: 26

## 2019-01-08 RX ORDER — ACETAMINOPHEN 500 MG
650 TABLET ORAL ONCE
Qty: 0 | Refills: 0 | Status: COMPLETED | OUTPATIENT
Start: 2019-01-08 | End: 2019-01-08

## 2019-01-08 RX ORDER — HYDRALAZINE HCL 50 MG
10 TABLET ORAL ONCE
Qty: 0 | Refills: 0 | Status: COMPLETED | OUTPATIENT
Start: 2019-01-08 | End: 2019-01-08

## 2019-01-08 RX ORDER — CARVEDILOL PHOSPHATE 80 MG/1
12.5 CAPSULE, EXTENDED RELEASE ORAL ONCE
Qty: 0 | Refills: 0 | Status: COMPLETED | OUTPATIENT
Start: 2019-01-08 | End: 2019-01-08

## 2019-01-08 RX ORDER — CARVEDILOL PHOSPHATE 80 MG/1
25 CAPSULE, EXTENDED RELEASE ORAL ONCE
Qty: 0 | Refills: 0 | Status: COMPLETED | OUTPATIENT
Start: 2019-01-08 | End: 2019-01-08

## 2019-01-08 RX ORDER — LOSARTAN POTASSIUM 100 MG/1
100 TABLET, FILM COATED ORAL DAILY
Qty: 0 | Refills: 0 | Status: DISCONTINUED | OUTPATIENT
Start: 2019-01-08 | End: 2019-01-12

## 2019-01-08 RX ORDER — LEVOTHYROXINE SODIUM 125 MCG
100 TABLET ORAL ONCE
Qty: 0 | Refills: 0 | Status: COMPLETED | OUTPATIENT
Start: 2019-01-08 | End: 2019-01-08

## 2019-01-08 RX ORDER — ONDANSETRON 8 MG/1
4 TABLET, FILM COATED ORAL ONCE
Qty: 0 | Refills: 0 | Status: COMPLETED | OUTPATIENT
Start: 2019-01-08 | End: 2019-01-08

## 2019-01-08 RX ORDER — MECLIZINE HCL 12.5 MG
25 TABLET ORAL ONCE
Qty: 0 | Refills: 0 | Status: COMPLETED | OUTPATIENT
Start: 2019-01-08 | End: 2019-01-08

## 2019-01-08 RX ADMIN — Medication 650 MILLIGRAM(S): at 13:30

## 2019-01-08 RX ADMIN — CARVEDILOL PHOSPHATE 12.5 MILLIGRAM(S): 80 CAPSULE, EXTENDED RELEASE ORAL at 12:58

## 2019-01-08 RX ADMIN — Medication 25 MILLIGRAM(S): at 14:44

## 2019-01-08 RX ADMIN — ONDANSETRON 4 MILLIGRAM(S): 8 TABLET, FILM COATED ORAL at 11:36

## 2019-01-08 RX ADMIN — Medication 100 MICROGRAM(S): at 19:25

## 2019-01-08 RX ADMIN — CARVEDILOL PHOSPHATE 12.5 MILLIGRAM(S): 80 CAPSULE, EXTENDED RELEASE ORAL at 14:43

## 2019-01-08 RX ADMIN — LOSARTAN POTASSIUM 100 MILLIGRAM(S): 100 TABLET, FILM COATED ORAL at 11:35

## 2019-01-08 RX ADMIN — Medication 650 MILLIGRAM(S): at 11:36

## 2019-01-08 RX ADMIN — Medication 10 MILLIGRAM(S): at 14:44

## 2019-01-08 NOTE — ED PROVIDER NOTE - NSFOLLOWUPINSTRUCTIONS_ED_ALL_ED_FT
Please continue taking your medications as prescribed.  Please follow-up with your PMD within 1 week.  Please return to the ED and seek immediate medical care if you experience new or worsening symptoms including: chest pain, shortness of breath, worsening abdominal pain, nausea, diarrhea.

## 2019-01-08 NOTE — ED PROVIDER NOTE - PROGRESS NOTE DETAILS
Allen Hobbs MD PGY1: pt was ready for discharge however when we took pt's vitals before show had a temp of 93.8 oral and rectal and was bradycardic at 50 bpm. Pt is has hypothyroidism and did not take he synthroid this morning. Will give her home med dose of synthroid and revital. P Allen Hobbs MD PGY1: Please call son to let him know mom status he will be pt transport if dc Nicanor Alfred [phone] Ivory: Pt was signed out to me pending labs, CXR, re-eval, likely admission. Pt with hx of fibroids, a fib, hypothyroidism, HTN  presented with abdominal pain, cleared for discharge initially after negative w/u in ED, but on discharge found to be hypothermic to 34 (rectally). W/u broadened to include lactate, blood cx, UA, CXR.  Pt placed on warming blancket. Lactate, UA and CXR are negative. Pending repeat rectal temp now. cj: lactate, CXR, UA wnl. thyroid tests wnl.  Pt feels much better and temperature is normal now. Still pending RVP but want her son to be called and wants to leave. low suspicion for flu, obtained in case of admission, but believe pt is safe for d/c home. Will discharge home.

## 2019-01-08 NOTE — ED ADULT NURSE NOTE - OBJECTIVE STATEMENT
Pt rcvd to room 19, BIBEMS from home for abd pain, nausea, vomiting, and back pain x 1month that worsened over past week. Last BM today. Pt hx: HTN, DM, CHF, fibroids. Pt states she has 2, 1 that measure 10cm and 1 that measures 2 cm. Pt denies diarrhea, fever/chills, vag bleeding. Pt states "my belly feels heavy." Abd soft, non distended. Aox4, ambulatory with walker at home. MDs at bedside.

## 2019-01-08 NOTE — ED PROVIDER NOTE - PMH
Congestive heart failure    Diabetes    Diabetes    HTN (hypertension)    Hypercholesterolemia    Hypertension    Hypothyroid

## 2019-01-08 NOTE — ED PROVIDER NOTE - MEDICAL DECISION MAKING DETAILS
Allen Hobbs MD PGY1: 84 yo female lives alone, with PMH of DM, HTN  and HLD, afib p/w lower quadrant pain b/l with n/v7 hrs ago. will get cbc, cmp lipase, pt ptt, ct abd pelvis, zofran, home meds for HTN

## 2019-01-08 NOTE — ED PROVIDER NOTE - ATTENDING CONTRIBUTION TO CARE
84 yo female lives alone, with PMH of DM, HTN  and HLD, afib p/w lower quadrant pain b/l with n/v x7 hrs ago. Pt states that her abdominal pain woke her up this morning and had n/v as well. Pt states that she had mild abdominal pain and feeling of fullness of the abdomen for 2 weeks but this her abdominal pain was intensified. Pt reports that abd pain now has decreased. Pt also state that she has that her Gyn referred her to a GYN oncologist after finding intrauterine masses on MRI. Pt also report dizziness which she take meclizine 25 mg. Denies any fever, chills, headache, sob , cp, blood in stool or urine, constipation, diarrhea. On exam: elevated BP as noted. lungs clear heart sounds normal abdo obese + lower abdo tenderness L>R, no masses, guarding or rebound. neuro neg. IMP: Lower abdo pain with N&V. Plan: Labs IVF, BP control (missed am BP meds) UA CT abdo, reassess

## 2019-01-08 NOTE — ED ADULT NURSE NOTE - NSFALLRSKOUTCOME_ED_ALL_ED
Liver Pathology Conference:    Liver Biopsy: a little endotheleitis and portal inflamation;  ?Minimal Rejection. /  4+ iron/ advanced fibrosis(stage 3).  
Fall Risk

## 2019-01-08 NOTE — ED ADULT NURSE NOTE - NSIMPLEMENTINTERV_GEN_ALL_ED
Implemented All Fall Risk Interventions:  [place] to call system. Call bell, personal items and telephone within reach. Instruct patient to call for assistance. Room bathroom lighting operational. Non-slip footwear when patient is off stretcher. Physically safe environment: no spills, clutter or unnecessary equipment. Stretcher in lowest position, wheels locked, appropriate side rails in place. Provide visual cue, wrist band, yellow gown, etc. Monitor gait and stability. Monitor for mental status changes and reorient to person, place, and time. Review medications for side effects contributing to fall risk. Reinforce activity limits and safety measures with patient and family.

## 2019-01-08 NOTE — ED PROVIDER NOTE - NS ED ROS FT
GENERAL: No fever or chills, EYES: no change in vision, HEENT: no trouble speaking, CARDIAC: no chest pain, palpitation PULMONARY: no cough or SOB, GI: see hpi : No changes in urination, SKIN: no rashes, NEURO: no headache,  MSK: No muscle pain ~Allen Hobbs MD (PGY 1)

## 2019-01-08 NOTE — ED PROVIDER NOTE - OBJECTIVE STATEMENT
Allen Hobbs MD PGY1: 82 yo female lives alone, with PMH of DM, HTN  and HLD, afib p/w lower quadrant pain b/l with n/v x7 hrs ago. Pt states that her abdominal pain woke her up this morning and had n/v as well. Pt states that she had mild abdominal pain and feeling of fullness of the abdomen for 2 weeks but this her abdominal pain was intensified. Pt reports that abd pain now has decreased. Pt also state that she has that her Gyn referred her to a GYN oncologist after finding intrauterine masses on MRI. Pt also report dizziness which she take meclizine 25 mg. Denies any fever, chills, headache, sob , cp, blood in stool or urine, constipation, diarrhea.

## 2019-01-09 DIAGNOSIS — Z71.89 OTHER SPECIFIED COUNSELING: ICD-10-CM

## 2019-01-09 LAB
SPECIMEN SOURCE: SIGNIFICANT CHANGE UP
SPECIMEN SOURCE: SIGNIFICANT CHANGE UP

## 2019-01-10 LAB
BACTERIA UR CULT: SIGNIFICANT CHANGE UP
SPECIMEN SOURCE: SIGNIFICANT CHANGE UP

## 2019-01-11 PROBLEM — Z86.39 HISTORY OF HYPERLIPIDEMIA: Status: ACTIVE | Noted: 2019-01-11

## 2019-01-11 PROBLEM — I48.91 ATRIAL FIBRILLATION: Status: ACTIVE | Noted: 2019-01-11

## 2019-01-11 PROBLEM — I10 HYPERTENSION: Status: ACTIVE | Noted: 2019-01-11

## 2019-01-13 LAB
BACTERIA BLD CULT: SIGNIFICANT CHANGE UP
BACTERIA BLD CULT: SIGNIFICANT CHANGE UP

## 2019-01-14 ENCOUNTER — APPOINTMENT (OUTPATIENT)
Dept: GYNECOLOGIC ONCOLOGY | Facility: CLINIC | Age: 84
End: 2019-01-14
Payer: MEDICARE

## 2019-01-14 VITALS
TEMPERATURE: 97.6 F | HEART RATE: 79 BPM | BODY MASS INDEX: 38.89 KG/M2 | HEIGHT: 61 IN | WEIGHT: 206 LBS | SYSTOLIC BLOOD PRESSURE: 185 MMHG | DIASTOLIC BLOOD PRESSURE: 91 MMHG | OXYGEN SATURATION: 93 %

## 2019-01-14 DIAGNOSIS — Z86.39 PERSONAL HISTORY OF OTHER ENDOCRINE, NUTRITIONAL AND METABOLIC DISEASE: ICD-10-CM

## 2019-01-14 DIAGNOSIS — D25.9 LEIOMYOMA OF UTERUS, UNSPECIFIED: ICD-10-CM

## 2019-01-14 DIAGNOSIS — I10 ESSENTIAL (PRIMARY) HYPERTENSION: ICD-10-CM

## 2019-01-14 DIAGNOSIS — Z86.79 PERSONAL HISTORY OF OTHER DISEASES OF THE CIRCULATORY SYSTEM: ICD-10-CM

## 2019-01-14 DIAGNOSIS — I48.91 UNSPECIFIED ATRIAL FIBRILLATION: ICD-10-CM

## 2019-01-14 PROCEDURE — 99205 OFFICE O/P NEW HI 60 MIN: CPT

## 2019-01-14 RX ORDER — NYSTATIN 100K UNIT
100000 TABLET VAGINAL
Refills: 0 | Status: ACTIVE | COMMUNITY

## 2019-01-14 RX ORDER — GLYBURIDE 5 MG/1
5 TABLET ORAL
Refills: 0 | Status: ACTIVE | COMMUNITY

## 2019-01-14 RX ORDER — LEVOTHYROXINE SODIUM 0.1 MG/1
100 TABLET ORAL
Refills: 0 | Status: ACTIVE | COMMUNITY

## 2019-01-14 RX ORDER — CELECOXIB 200 MG/1
200 CAPSULE ORAL
Refills: 0 | Status: ACTIVE | COMMUNITY

## 2019-01-14 RX ORDER — CARVEDILOL 12.5 MG/1
12.5 TABLET, FILM COATED ORAL
Refills: 0 | Status: ACTIVE | COMMUNITY

## 2019-01-14 RX ORDER — METFORMIN HYDROCHLORIDE 500 MG/1
500 TABLET, COATED ORAL
Refills: 0 | Status: ACTIVE | COMMUNITY

## 2019-01-14 RX ORDER — APIXABAN 2.5 MG/1
2.5 TABLET, FILM COATED ORAL
Refills: 0 | Status: ACTIVE | COMMUNITY

## 2019-01-14 RX ORDER — CLONIDINE HYDROCHLORIDE 0.3 MG/1
0.3 TABLET ORAL
Refills: 0 | Status: ACTIVE | COMMUNITY

## 2019-01-14 RX ORDER — NITROGLYCERIN 0.4 MG/1
0.4 TABLET SUBLINGUAL
Refills: 0 | Status: ACTIVE | COMMUNITY

## 2019-01-14 RX ORDER — RANOLAZINE 500 MG/1
500 TABLET, FILM COATED, EXTENDED RELEASE ORAL
Refills: 0 | Status: ACTIVE | COMMUNITY

## 2019-01-14 RX ORDER — KETOROLAC TROMETHAMINE 5 MG/ML
0.5 SOLUTION OPHTHALMIC
Refills: 0 | Status: ACTIVE | COMMUNITY

## 2019-01-14 RX ORDER — DICLOFENAC SODIUM 1 MG/ML
0.1 SOLUTION OPHTHALMIC
Refills: 0 | Status: ACTIVE | COMMUNITY

## 2019-01-14 RX ORDER — LOSARTAN POTASSIUM 100 MG/1
100 TABLET, FILM COATED ORAL
Refills: 0 | Status: ACTIVE | COMMUNITY

## 2019-01-14 RX ORDER — CANAGLIFLOZIN 100 MG/1
100 TABLET, FILM COATED ORAL
Refills: 0 | Status: ACTIVE | COMMUNITY

## 2019-01-14 RX ORDER — ROSUVASTATIN CALCIUM 10 MG/1
10 TABLET, FILM COATED ORAL
Refills: 0 | Status: ACTIVE | COMMUNITY

## 2019-01-25 ENCOUNTER — OUTPATIENT (OUTPATIENT)
Dept: OUTPATIENT SERVICES | Facility: HOSPITAL | Age: 84
LOS: 1 days | End: 2019-01-25
Payer: MEDICARE

## 2019-01-25 VITALS
HEIGHT: 55 IN | HEART RATE: 67 BPM | WEIGHT: 207.01 LBS | DIASTOLIC BLOOD PRESSURE: 80 MMHG | TEMPERATURE: 99 F | RESPIRATION RATE: 18 BRPM | SYSTOLIC BLOOD PRESSURE: 158 MMHG | OXYGEN SATURATION: 97 %

## 2019-01-25 DIAGNOSIS — D25.9 LEIOMYOMA OF UTERUS, UNSPECIFIED: ICD-10-CM

## 2019-01-25 DIAGNOSIS — Z98.890 OTHER SPECIFIED POSTPROCEDURAL STATES: Chronic | ICD-10-CM

## 2019-01-25 DIAGNOSIS — E11.9 TYPE 2 DIABETES MELLITUS WITHOUT COMPLICATIONS: ICD-10-CM

## 2019-01-25 DIAGNOSIS — E03.9 HYPOTHYROIDISM, UNSPECIFIED: ICD-10-CM

## 2019-01-25 DIAGNOSIS — R21 RASH AND OTHER NONSPECIFIC SKIN ERUPTION: ICD-10-CM

## 2019-01-25 DIAGNOSIS — Z90.49 ACQUIRED ABSENCE OF OTHER SPECIFIED PARTS OF DIGESTIVE TRACT: Chronic | ICD-10-CM

## 2019-01-25 DIAGNOSIS — I10 ESSENTIAL (PRIMARY) HYPERTENSION: ICD-10-CM

## 2019-01-25 DIAGNOSIS — R06.09 OTHER FORMS OF DYSPNEA: ICD-10-CM

## 2019-01-25 DIAGNOSIS — I50.9 HEART FAILURE, UNSPECIFIED: ICD-10-CM

## 2019-01-25 DIAGNOSIS — I48.91 UNSPECIFIED ATRIAL FIBRILLATION: ICD-10-CM

## 2019-01-25 DIAGNOSIS — R06.83 SNORING: ICD-10-CM

## 2019-01-25 LAB
ANION GAP SERPL CALC-SCNC: 14 MMO/L — SIGNIFICANT CHANGE UP (ref 7–14)
BLD GP AB SCN SERPL QL: NEGATIVE — SIGNIFICANT CHANGE UP
BUN SERPL-MCNC: 19 MG/DL — SIGNIFICANT CHANGE UP (ref 7–23)
CALCIUM SERPL-MCNC: 8.7 MG/DL — SIGNIFICANT CHANGE UP (ref 8.4–10.5)
CHLORIDE SERPL-SCNC: 101 MMOL/L — SIGNIFICANT CHANGE UP (ref 98–107)
CO2 SERPL-SCNC: 22 MMOL/L — SIGNIFICANT CHANGE UP (ref 22–31)
CREAT SERPL-MCNC: 1.11 MG/DL — SIGNIFICANT CHANGE UP (ref 0.5–1.3)
GLUCOSE SERPL-MCNC: 115 MG/DL — HIGH (ref 70–99)
HBA1C BLD-MCNC: 6.7 % — HIGH (ref 4–5.6)
HCT VFR BLD CALC: 31.6 % — LOW (ref 34.5–45)
HGB BLD-MCNC: 8.9 G/DL — LOW (ref 11.5–15.5)
MCHC RBC-ENTMCNC: 20.1 PG — LOW (ref 27–34)
MCHC RBC-ENTMCNC: 28.2 % — LOW (ref 32–36)
MCV RBC AUTO: 71.5 FL — LOW (ref 80–100)
NRBC # FLD: 0 K/UL — LOW (ref 25–125)
PLATELET # BLD AUTO: 249 K/UL — SIGNIFICANT CHANGE UP (ref 150–400)
PMV BLD: 11.9 FL — SIGNIFICANT CHANGE UP (ref 7–13)
POTASSIUM SERPL-MCNC: 4.4 MMOL/L — SIGNIFICANT CHANGE UP (ref 3.5–5.3)
POTASSIUM SERPL-SCNC: 4.4 MMOL/L — SIGNIFICANT CHANGE UP (ref 3.5–5.3)
RBC # BLD: 4.42 M/UL — SIGNIFICANT CHANGE UP (ref 3.8–5.2)
RBC # FLD: 18.5 % — HIGH (ref 10.3–14.5)
RH IG SCN BLD-IMP: NEGATIVE — SIGNIFICANT CHANGE UP
SODIUM SERPL-SCNC: 137 MMOL/L — SIGNIFICANT CHANGE UP (ref 135–145)
WBC # BLD: 7.62 K/UL — SIGNIFICANT CHANGE UP (ref 3.8–10.5)
WBC # FLD AUTO: 7.62 K/UL — SIGNIFICANT CHANGE UP (ref 3.8–10.5)

## 2019-01-25 PROCEDURE — 93010 ELECTROCARDIOGRAM REPORT: CPT

## 2019-01-25 RX ORDER — SENNA PLUS 8.6 MG/1
3 TABLET ORAL
Qty: 0 | Refills: 0 | COMMUNITY

## 2019-01-25 RX ORDER — GLIMEPIRIDE 1 MG
5 TABLET ORAL
Qty: 0 | Refills: 0 | COMMUNITY

## 2019-01-25 RX ORDER — LOSARTAN POTASSIUM 100 MG/1
1 TABLET, FILM COATED ORAL
Qty: 0 | Refills: 0 | COMMUNITY

## 2019-01-25 RX ORDER — ROSUVASTATIN CALCIUM 5 MG/1
1 TABLET ORAL
Qty: 0 | Refills: 0 | COMMUNITY

## 2019-01-25 RX ORDER — METFORMIN HYDROCHLORIDE 850 MG/1
1 TABLET ORAL
Qty: 0 | Refills: 0 | COMMUNITY

## 2019-01-25 RX ORDER — MECLIZINE HCL 12.5 MG
1 TABLET ORAL
Qty: 0 | Refills: 0 | COMMUNITY

## 2019-01-25 RX ORDER — LOSARTAN/HYDROCHLOROTHIAZIDE 100MG-25MG
1 TABLET ORAL
Qty: 0 | Refills: 0 | COMMUNITY

## 2019-01-25 RX ORDER — ASPIRIN/CALCIUM CARB/MAGNESIUM 324 MG
1 TABLET ORAL
Qty: 0 | Refills: 0 | COMMUNITY

## 2019-01-25 RX ORDER — GLYBURIDE 5 MG
1 TABLET ORAL
Qty: 0 | Refills: 0 | COMMUNITY

## 2019-01-25 RX ORDER — DIGOXIN 250 MCG
1 TABLET ORAL
Qty: 0 | Refills: 0 | COMMUNITY

## 2019-01-25 RX ORDER — APIXABAN 2.5 MG/1
0 TABLET, FILM COATED ORAL
Qty: 0 | Refills: 0 | COMMUNITY

## 2019-01-25 RX ORDER — POTASSIUM CHLORIDE 20 MEQ
1 PACKET (EA) ORAL
Qty: 0 | Refills: 0 | COMMUNITY

## 2019-01-25 RX ORDER — LEVOTHYROXINE SODIUM 125 MCG
1 TABLET ORAL
Qty: 0 | Refills: 0 | COMMUNITY

## 2019-01-25 RX ORDER — CARVEDILOL PHOSPHATE 80 MG/1
1 CAPSULE, EXTENDED RELEASE ORAL
Qty: 0 | Refills: 0 | COMMUNITY

## 2019-01-25 RX ORDER — FUROSEMIDE 40 MG
0 TABLET ORAL
Qty: 0 | Refills: 0 | COMMUNITY

## 2019-01-25 RX ORDER — DIPHENHYDRAMINE HCL 50 MG
0 CAPSULE ORAL
Qty: 0 | Refills: 0 | COMMUNITY

## 2019-01-25 NOTE — H&P PST ADULT - HISTORY OF PRESENT ILLNESS
82 y/o female with PMH of Atrial Fibrillation on Eliquis, CHF, Type 2 DM, HTN and HLD presents to PST for preoperative evaluation with diagnosis of leiomyoma of uterus. Pt presented to her GYN reporting severe lower abdominal pain and abdominal fullness. A MRI of the pelvis on 12/24/18 revealed multiple uterine fibroids. Pt referred to GYN/oncology for evaluation of quickly enlarging uterus/ fibroids. Scheduled for Examination Under Anesthesia , Exploratory Laparotomy, Total Abdominal Hysterectomy, Bilateral Salpingo Oophorectomy, Possible Staging on 2/5/2019. 84 y/o female with PMH of Atrial Fibrillation on Eliquis, CHF, Type 2 DM, HTN and HLD presents to PST for preoperative evaluation with diagnosis of leiomyoma of uterus. Pt presented to her GYN reporting severe lower abdominal pain and abdominal fullness. A MRI of the pelvis on 12/24/18 revealed multiple uterine fibroids. Pt referred to GYN/oncology for evaluation of quickly enlarging uterus/ fibroids. Scheduled for Examination Under Anesthesia, Exploratory Laparotomy, Total Abdominal Hysterectomy, Bilateral Salpingo Oophorectomy, Possible Staging on 2/5/2019.

## 2019-01-25 NOTE — H&P PST ADULT - NEGATIVE GENERAL GENITOURINARY SYMPTOMS
no hematuria/no flank pain R/no flank pain L/no dysuria/no urinary hesitancy/normal urinary frequency

## 2019-01-25 NOTE — H&P PST ADULT - NSANTHOSAYNRD_GEN_A_CORE
No. NURIA screening performed.  STOP BANG Legend: 0-2 = LOW Risk; 3-4 = INTERMEDIATE Risk; 5-8 = HIGH Risk

## 2019-01-25 NOTE — H&P PST ADULT - RS GEN PE MLT RESP DETAILS PC
good air movement/breath sounds equal/respirations non-labored/no wheezes/airway patent/no chest wall tenderness/clear to auscultation bilaterally

## 2019-01-25 NOTE — H&P PST ADULT - LYMPHATIC
posterior cervical L/posterior cervical R/supraclavicular R/anterior cervical L/anterior cervical R/supraclavicular L

## 2019-01-25 NOTE — H&P PST ADULT - PROBLEM SELECTOR PLAN 4
Pt states she was instructed by Dr. Guidry to hold Eliquis 2 days preop.  She will see her Cardiologist next week for cardiac clearance   ECHO report and cardiac clearance note requested  Stress Test report on chart

## 2019-01-25 NOTE — H&P PST ADULT - ITE SK HX ROS MEA POS PC
rash to upper and lower extremities/itching/rash new rash to upper and lower extremities/rash/itching

## 2019-01-25 NOTE — H&P PST ADULT - PMH
Atrial fibrillation    Cataract    Congestive heart failure  pt denies history  Diabetes  type 2 DM  Dyspnea on exertion    HTN (hypertension)    Hypercholesterolemia    Hypertension    Hypothyroid    Leiomyoma of uterus    Morbid obesity    Obesity    Vertigo

## 2019-01-25 NOTE — H&P PST ADULT - VENOUS THROMBOEMBOLISM CURRENT STATUS
(1) swollen legs (current)/(1) other risk factor (includes escalating BMI, pack-years of smoking, diabetes requiring insulin, chemotherapy, female gender and length of surgery)

## 2019-01-25 NOTE — H&P PST ADULT - PROBLEM SELECTOR PLAN 1
Scheduled for Examination Under Anesthesia , Exploratory Laparotomy, Total Abdominal Hysterectomy, Bilateral Salpingo Oophorectomy, Possible Staging on 2/5/2019.  Preop instructions given to patient, understanding verbalized   Chlorhexidine wash and GI prophylaxis provided Scheduled for Examination Under Anesthesia , Exploratory Laparotomy, Total Abdominal Hysterectomy, Bilateral Salpingo Oophorectomy, Possible Staging on 2/5/2019.  Preop instructions given, patient verbalized understanding   Chlorhexidine wash and GI prophylaxis provided

## 2019-01-25 NOTE — H&P PST ADULT - NEGATIVE BREAST SYMPTOMS
no breast tenderness L/no nipple discharge R/no breast lump R/no breast lump L/no nipple discharge L/no breast tenderness R

## 2019-01-25 NOTE — H&P PST ADULT - PROBLEM SELECTOR PLAN 8
Pt to hold Metformin for 24 hours preop  She will not take Glyburide on day of surgery   Accu check to be assessed on admission Pt to hold Metformin for 24 hours preop  She will not take Glyburide on day of surgery   Accu check to be assessed on admission  Pt reports polyuria- medical clearance requested

## 2019-01-26 PROBLEM — E11.9 TYPE 2 DIABETES MELLITUS WITHOUT COMPLICATIONS: Chronic | Status: ACTIVE | Noted: 2018-11-26

## 2019-02-05 ENCOUNTER — APPOINTMENT (OUTPATIENT)
Dept: GYNECOLOGIC ONCOLOGY | Facility: HOSPITAL | Age: 84
End: 2019-02-05

## 2019-02-05 PROBLEM — E66.01 MORBID (SEVERE) OBESITY DUE TO EXCESS CALORIES: Chronic | Status: ACTIVE | Noted: 2019-01-25

## 2019-02-05 PROBLEM — H26.9 UNSPECIFIED CATARACT: Chronic | Status: ACTIVE | Noted: 2019-01-25

## 2019-02-05 PROBLEM — R06.09 OTHER FORMS OF DYSPNEA: Chronic | Status: ACTIVE | Noted: 2019-01-25

## 2019-02-05 PROBLEM — R42 DIZZINESS AND GIDDINESS: Chronic | Status: ACTIVE | Noted: 2019-01-25

## 2019-02-05 PROBLEM — E66.9 OBESITY, UNSPECIFIED: Chronic | Status: ACTIVE | Noted: 2019-01-25

## 2019-02-05 PROBLEM — I48.91 UNSPECIFIED ATRIAL FIBRILLATION: Chronic | Status: ACTIVE | Noted: 2019-01-25

## 2019-02-05 PROBLEM — D25.9 LEIOMYOMA OF UTERUS, UNSPECIFIED: Chronic | Status: ACTIVE | Noted: 2019-01-25

## 2019-02-12 NOTE — PHYSICAL THERAPY INITIAL EVALUATION ADULT - STANDING BALANCE: STATIC
Impression: Retinal edema: H35.81.
s/p PPVx W/ ERMx Plan: OCT ordered and performed today, Discussed diagnosis in detail with patient. Discussed treatment options with patient. Reassured patient of current condition and treatment. Will continue to observe condition and or symptoms. Call if [address] worsens.  ok to se [employer] for new MRx
with Rollator/good minus

## 2019-02-25 ENCOUNTER — APPOINTMENT (OUTPATIENT)
Dept: GYNECOLOGIC ONCOLOGY | Facility: CLINIC | Age: 84
End: 2019-02-25

## 2019-03-01 ENCOUNTER — APPOINTMENT (OUTPATIENT)
Dept: GYNECOLOGIC ONCOLOGY | Facility: HOSPITAL | Age: 84
End: 2019-03-01

## 2019-03-05 NOTE — ED ADULT TRIAGE NOTE - NS ED NURSE DIRECT TO ROOM YN
[Healthy eating counseling provided] : healthy eating [Activity counseling provided] : activity [Behavioral health counseling provided] : behavioral health  [Fall prevention counseling provided] : fall prevention  [Low Salt Diet] : Low salt diet Yes [___ min/wk activity recommended] : [unfilled] min/wk activity recommended [Walking] : Walking [Engage in a relaxing activity] : Engage in a relaxing activity [None] : None [Good understanding] : Patient has a good understanding of lifestyle changes and the steps needed to achieve self management goals

## 2020-06-01 NOTE — ED ADULT TRIAGE NOTE - ESI TRIAGE ACUITY LEVEL, MLM
3 Full Thickness Lip Wedge Repair (Flap) Text: Given the location of the defect and the proximity to free margins a full thickness wedge repair was deemed most appropriate.  Using a sterile surgical marker, the appropriate repair was drawn incorporating the defect and placing the expected incisions perpendicular to the vermilion border.  The vermilion border was also meticulously outlined to ensure appropriate reapproximation during the repair.  The area thus outlined was incised through and through with a #15 scalpel blade.  The muscularis and dermis were reaproximated with deep sutures following hemostasis. Care was taken to realign the vermilion border before proceeding with the superficial closure.  Once the vermilion was realigned the superfical and mucosal closure was finished.

## 2022-02-03 NOTE — H&P PST ADULT - FAMILY HISTORY
tolerating fluids well continues tolerating po.  Made appointment with GI for monday No pertinent family history in first degree relatives

## 2022-05-14 NOTE — ED PROVIDER NOTE - PHYSICAL EXAMINATION
Gen: AAOx3, non-toxic  Head: NCAT  HEENT: EOMI, PERRLA, oral mucosa moist, normal conjunctiva  Lung: CTAB, no respiratory distress, no wheezes/rhonchi/rales B/L, speaking in full sentences  CV: RRR, no murmurs, rubs or gallops  Abd: soft, mildly distented, lower quadrant TTP b/l, no guarding, no CVA tenderness, no rebound tenderness, no hernias noted  MSK: no visible deformities, full range of motion of all 4 exts  Neuro: No focal sensory or motor deficits  Skin: Warm, well perfused, no rash  Psych: normal affect.   ~Allen Hobbs MD (PGY1)
98.1

## 2023-11-27 NOTE — H&P ADULT - CONSTITUTIONAL DETAILS
Pt discharged in stable condition. Discharge instructions reviewed and understanding verbalized. All questions and concerns addressed.        Dejah Messina RN  11/26/23 [id number]     well-groomed/no distress

## 2024-05-25 NOTE — ED ADULT NURSE NOTE - ED STAT RN HAND OFF
"Speech Language Therapy Discharge Summary    Reason for therapy discharge:    Discharged to home with home therapy.    Progress towards therapy goal(s). See goals on Care Plan in [facility] electronic health record for goal details.  Goals partially met.  Barriers to achieving goals:   discharge from facility.    Therapy recommendation(s):    Continued therapy is recommended.  Rationale/Recommendations:  for dysphagia.      Recommendation at the time of hospital discharge:    \"Continue soft and bite size diet with thin liquids, eating only when alert and upright, close supervision to ensure safe alertness and positioning, offer tray set up and feeding assistance if needed. 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 Patient does not like meds crushed but that is an option if whole in pudding become an issue.\"  " Handoff

## 2025-07-09 NOTE — H&P PST ADULT - NEGATIVE OPHTHALMOLOGIC SYMPTOMS
BIBA from [facility] for Fall from standing. + headstrike, - loc, + thinners. Right foot short and out turned. Aox2 baseline   no blurred vision R/no photophobia/no blurred vision L